# Patient Record
Sex: FEMALE | Race: WHITE | NOT HISPANIC OR LATINO | Employment: OTHER | URBAN - METROPOLITAN AREA
[De-identification: names, ages, dates, MRNs, and addresses within clinical notes are randomized per-mention and may not be internally consistent; named-entity substitution may affect disease eponyms.]

---

## 2017-07-31 ENCOUNTER — PREPPED CHART (OUTPATIENT)
Dept: URBAN - METROPOLITAN AREA CLINIC 46 | Facility: CLINIC | Age: 72
End: 2017-07-31

## 2018-04-26 NOTE — PATIENT DISCUSSION
Based on today's exam, diagnostic studies, and/or review of records, the determination was made for observation and follow-up.

## 2018-08-13 ASSESSMENT — VISUAL ACUITY
OS_CC: 20/20
OD_CC: 20/20
OS_SC: 20/70
OS_CC: J1
OD_CC: J1
OD_SC: 20/30
OD_SC: J>10
OS_SC: J10

## 2018-08-13 ASSESSMENT — TONOMETRY
OD_IOP_MMHG: 19
OS_IOP_MMHG: 19

## 2018-08-16 ENCOUNTER — ESTABLISHED COMPREHENSIVE EXAM (OUTPATIENT)
Dept: URBAN - METROPOLITAN AREA CLINIC 46 | Facility: CLINIC | Age: 73
End: 2018-08-16

## 2018-08-16 DIAGNOSIS — E11.9: ICD-10-CM

## 2018-08-16 DIAGNOSIS — H52.7: ICD-10-CM

## 2018-08-16 PROCEDURE — G8427 DOCREV CUR MEDS BY ELIG CLIN: HCPCS

## 2018-08-16 PROCEDURE — 92015 DETERMINE REFRACTIVE STATE: CPT

## 2018-08-16 PROCEDURE — 2022F DILAT RTA XM EVC RTNOPTHY: CPT

## 2018-08-16 PROCEDURE — 1036F TOBACCO NON-USER: CPT

## 2018-08-16 PROCEDURE — G9903 PT SCRN TBCO ID AS NON USER: HCPCS

## 2018-08-16 PROCEDURE — 92014 COMPRE OPH EXAM EST PT 1/>: CPT

## 2018-08-16 ASSESSMENT — VISUAL ACUITY
OS_AM: 20/20
OS_SC: 20/70
OS_CC: 20/30
OD_SC: 20/40
OD_CC: 20/20-1
OS_SC: J5
OD_PAM: 20/25
OD_SC: J<10
OD_CC: J2
OS_CC: J1+
OS_PH: 20/40
OD_PH: 20/25

## 2018-08-16 ASSESSMENT — TONOMETRY
OD_IOP_MMHG: 18
OS_IOP_MMHG: 18

## 2019-08-20 ENCOUNTER — ESTABLISHED COMPREHENSIVE EXAM (OUTPATIENT)
Dept: URBAN - METROPOLITAN AREA CLINIC 46 | Facility: CLINIC | Age: 74
End: 2019-08-20

## 2019-08-20 DIAGNOSIS — E11.9: ICD-10-CM

## 2019-08-20 DIAGNOSIS — H52.7: ICD-10-CM

## 2019-08-20 PROCEDURE — 92015 DETERMINE REFRACTIVE STATE: CPT

## 2019-08-20 PROCEDURE — 92014 COMPRE OPH EXAM EST PT 1/>: CPT

## 2019-08-20 ASSESSMENT — VISUAL ACUITY
OS_SC: J10
OD_CC: J3
OD_SC: 20/30
OS_CC: J3
OD_CC: 20/25
OS_CC: 20/25
OD_SC: J<10
OS_SC: 20/70

## 2019-08-20 ASSESSMENT — TONOMETRY
OS_IOP_MMHG: 18
OD_IOP_MMHG: 18

## 2019-08-28 NOTE — PATIENT DISCUSSION
8/28/19 Based on today's exam, diagnostic studies, and/or review of records, the determination was made for observation and follow-up. mild worsening OD.

## 2019-08-28 NOTE — PATIENT DISCUSSION
Monitor. May need anterior orbitotomy OU with ex/bx in the future. Not pushing forward at this time.

## 2019-08-29 NOTE — PATIENT DISCUSSION
Care After your Pain Management Procedure            Dr Santos   [phone]    ____Epidural Steroid Injection            ____Caudal Epidural Steroid Injection  ____Facet Joint Injection                        ____Hip Joint Injection        ____Selective Spinal Nerve Injection  ____Transforaminal Epidural Steroid Injection       ____SI Joint Injection  ____Piriformis Muscle Injection      Activity  • Do activity as tolerated.     Bathing  • You may shower tomorrow.    Diet  • You may resume your regular diet.    Dressing/Incision Care   • Keep the injection site clean and dry.   • You may use an ice pack on and off over the injection site for the next 24 hours while awake as needed.    Medication  • Start your home medications today.       Call the Neurospine Physiatrist if you have the following:  • Drainage, increase in redness and/or swelling from the injection site.   • Temperature above 101 degrees.    • Numbness or weakness of your legs or arms different than before the injection.   • Severe headache.                        Retinal tear and detachment warning symptoms reviewed and patient instructed to call immediately if increasing floaters, flashes, or decreasing peripheral vision.

## 2019-10-08 NOTE — PATIENT DISCUSSION
Patient informed of available non-opioid medications and other non-pharmacological options. Discussed advantages and disadvantages of the alternatives, including whether the patient is at-risk for, or has a history of, controlled substance abuse or misuse, and the patient’s personal preferences. [address] [facility] “Opioid Alternative Pamphlet” was provided to patient.

## 2019-10-29 NOTE — PATIENT DISCUSSION
Patient informed of available non-opioid medications and other non-pharmacological options. Discussed advantages and disadvantages of the alternatives, including whether the patient is at-risk for, or has a history of, controlled substance abuse or misuse, and the patient’s personal preferences. 516 [facility] “Opioid Alternative Pamphlet” was provided to patient.

## 2019-10-29 NOTE — PATIENT DISCUSSION
Patient informed of available non-opioid medications and other non-pharmacological options. Discussed advantages and disadvantages of the alternatives, including whether the patient is at-risk for, or has a history of, controlled substance abuse or misuse, and the patient’s personal preferences. [facility] “Opioid Alternative Pamphlet” was provided to patient.

## 2019-10-29 NOTE — PATIENT DISCUSSION
Recommended excision/discard during eyelid surgery. Smoking cessation .discussed in great details.  P-t will not consider Chantix at this time due to concerns of possible side effects  OTC choices discussed - patch, lozenges  Hot line info provided  Start Wellbutrin as RX

## 2019-10-30 NOTE — PATIENT DISCUSSION
One day post op: great curve and shape, no infection, healing well, sutures intact, use erythromycin TID to upper eyelids, use cold packs, sleep at a 30 degree angle. Wear sunglasses and hat while outdoors. Avoid sun exposure.

## 2019-10-30 NOTE — PATIENT DISCUSSION
Patient informed of available non-opioid medications and other non-pharmacological options. Discussed advantages and disadvantages of the alternatives, including whether the patient is at-risk for, or has a history of, controlled substance abuse or misuse, and the patient’s personal preferences. [facility] “Opioid Alternative Pamphlet” was provided to patient.

## 2019-11-05 NOTE — PATIENT DISCUSSION
One week post op: lids in good position, no infection, healing well, use erythromycin QHS to lower eyelids and Tobradex QHS x 7-10 days. Wear sunglasses and hat while outdoors. Avoid sun exposure. No restrictions in 5 days.

## 2019-11-05 NOTE — PATIENT DISCUSSION
One week post op: great curve and shape, no infection, healing well, sutures intact and removed, use erythromycin bid to upper eyelids x 1 wk, qhs x1wk.  Wear sunglasses and hat while outdoors. Avoid sun exposure. No restrictions in 5 days.

## 2019-11-20 NOTE — PATIENT DISCUSSION
Three week post op: lids in good position, no infection, healing well. Wear sunglasses and hat while outdoors. Avoid sun exposure. No restrictions.

## 2019-11-20 NOTE — PATIENT DISCUSSION
Three week post op: great curve and shape, no infection, healing well.  Wear sunglasses and hat while outdoors. Avoid sun exposure. No restrictions.

## 2019-11-20 NOTE — PATIENT DISCUSSION
Patient informed of available non-opioid medications and other non-pharmacological options. Discussed advantages and disadvantages of the alternatives, including whether the patient is at-risk for, or has a history of, controlled substance abuse or misuse, and the patient’s personal preferences. 516 [facility] “Opioid Alternative Pamphlet” was provided to patient.

## 2020-05-12 NOTE — PATIENT DISCUSSION
Based on today's exam, diagnostic studies, and/or review of records, the determination was made for observation and follow-up.
Continue topical antibiotic as directed.
Discussed condition and exacerbating conditions/situations (e.g., dry/arid environments, overhead fans, air conditioners, side effect of medications).
Discussed importance of yearly eye exams and provided patient with literature.
Discussed lid hygiene, warm compress and eyelid wash.
Healing well.
Hx LVC OU TWICE.  I do not recommend any further LVC attempts.
Minimal risk for malignant transformation discussed with patient. Recommend to observe and follow with serial exams.
Monitor.
Monitor. May need anterior orbitotomy OU with ex/bx in the future. Not pushing forward at this time.
No Retinal holes, Tears or Detachments.
OTC Pataday, cold compresses, Durezol QD samples given call STAT if worse.  try to STOP eye rubbing this makes it worse.
Observe.
Patient informed of available non-opioid medications and other non-pharmacological options. Discussed advantages and disadvantages of the alternatives, including whether the patient is at-risk for, or has a history of, controlled substance abuse or misuse, and the patient’s personal preferences. 516 [facility] “Opioid Alternative Pamphlet” was provided to patient.
Patient understands condition, prognosis and need for follow up care.
Recommend BRANDAN first then RUL anterior orbitotomy. Will follow up with patient early April 2020.
Recommend Bilateral lower lid blepharoplasty trans conj laser (discussed risks and benefits of sx. .. ).
Recommend Bilateral upper lid blepharoplasty. predeterm (discussed risks and benefits of sx. .. ).
Recommended artificial tears to use: 1 drop 4x a day in both eyes.
Recommended excision/discard during eyelid surgery.
Recommended observation.
Retinal tear and detachment warning symptoms reviewed and patient instructed to call immediately if increasing floaters, flashes, or decreasing peripheral vision.
Three week post op: great curve and shape, no infection, healing well.  Wear sunglasses and hat while outdoors. Avoid sun exposure. No restrictions.
Three week post op: lids in good position, no infection, healing well. Wear sunglasses and hat while outdoors. Avoid sun exposure. No restrictions.
No

## 2020-05-20 NOTE — PATIENT DISCUSSION
Patient informed of available non-opioid medications and other non-pharmacological options. Discussed advantages and disadvantages of the alternatives, including whether the patient is at-risk for, or has a history of, controlled substance abuse or misuse, and the patient’s personal preferences. 516 [facility] “Opioid Alternative Pamphlet” was provided to patient.

## 2020-06-01 NOTE — PATIENT DISCUSSION
Patient informed of available non-opioid medications and other non-pharmacological options. Discussed advantages and disadvantages of the alternatives, including whether the patient is at-risk for, or has a history of, controlled substance abuse or misuse, and the patient’s personal preferences. 516 [facility] “Opioid Alternative Pamphlet” was provided to patient.

## 2020-06-01 NOTE — PATIENT DISCUSSION
Patient informed of available non-opioid medications and other non-pharmacological options. Discussed advantages and disadvantages of the alternatives, including whether the patient is at-risk for, or has a history of, controlled substance abuse or misuse, and the patient’s personal preferences. 516 [facility] “Opioid Alternative Pamphlet” was provided to patient.

## 2020-06-01 NOTE — PROCEDURE NOTE: SURGICAL
<p><strong>NAME OF PHYSICIAN:&nbsp; Cassie Vergara MD</strong><br /><strong>PLACE OF SERVICE:&nbsp; [facility]</strong></p><p><strong></strong><br /><strong>#[id number]____________________________________________________________________________</strong><br /><br /><strong>PREOPERATIVE DIAGNOSIS: &nbsp; </strong>Fatty lesion, left orbit&nbsp; <br /><br /><strong>POSTOPERATIVE DIAGNOSIS: &nbsp; </strong>Same<br /><br /><strong>PROCEDURE: &nbsp; </strong>Anterior orbitotomy, with removal of fatty<br />&nbsp; lesion left orbit<br /><br /><strong>SURGEON: &nbsp; </strong>Sohail Barahona MD <br /><br /><strong>ANESTHESIA:&nbsp; </strong>Local MAC<br /><br /><strong>ESTIMATED BLOOD LOSS: &nbsp; </strong>Minimal, &lt; 10 cc&rsquo;s <br /><br /><strong>COMPLICATIONS: &nbsp; </strong>None.<br /><br /><strong>INDICATION:&nbsp; </strong>This patient had an elevated, fatty tumor of the left orbit which is pushing the lid off the globe and causing irritation. This has increased in size. We have talked about the risks and benefits of removing this and the patient understands the risks and benefits, including the risk of working with the eye, which includes double vision, loss of vision, bleeding and infection and dry eye and wishes to proceed. &nbsp; <br /><br /><strong>PROCEDURE:&nbsp; </strong>Prior to surgery, a time-out was performed in the pre-operative area.&nbsp; The team, consisting of the surgeon, nurse anesthetist, pre-op nurse and circulating nurse, were gathered around the patient. &nbsp; I confirmed the patient&rsquo;s identity and name, the side and site of the surgery and the type of surgery and procedure to be performed. &nbsp; The patient was brought to the operating room and laid in the supine position and marked with a blue marking pen along the left orbit and confirmed with the patient that this was the site of the left herniated fatty mass. The patient was then sedated and injected along the left lateral canthus and left superior fornix using Xylocaine 2% with epinephrine. &nbsp; The patient was then prepped and draped in the typical fashion for ophthalmic plastic surgery. A lid speculum was placed in the loeft eye and a &nbsp;7-0 nylon suture was placed through the limbus area of the left eye taking a partial thickness bite with the spatula needle so traction could be applied to the eye.&nbsp; The eye was then retracted downward and inward.&nbsp; A large herniation of fatty material was seen. A Andrew scissors was then used to make an incision to the conjunctiva and Tenon&rsquo;s capsule with a large herniation of fatty mass. &nbsp; The fatty mass was then teased away from the lateral rectus muscle and was cauterized with a bi-polar cautery meticulously along this base. The specium was sent to pathology. &nbsp; After cauterizing along the base of this fatty mass the lesion was removed with a Andrew scissors and then placed in a container for permanent section evaluation. &nbsp; The base of the fatty mass was cauterized again with bi-polar cautery meticulously for hemostasis care had been taken to stay away from the rectus muscle to avoid double vision. &nbsp; The area of the fornix was then closed with interrupted buried 6-0 plain sutures through conjunctiva and Tenon&rsquo;s capsule. &nbsp; After several of these sutures were placed the speculum was removed and ointment was placed to the eye.&nbsp; The patient was sent to the recovery room in stable condition. &nbsp;<br /></p>&nbsp;

## 2020-06-10 NOTE — PATIENT DISCUSSION
recommend whenit bothers pt enough to have sx on OD orbitotomy  (anterior) discussed risks and beneftits.

## 2020-09-08 ENCOUNTER — ESTABLISHED COMPREHENSIVE EXAM (OUTPATIENT)
Dept: URBAN - METROPOLITAN AREA CLINIC 46 | Facility: CLINIC | Age: 75
End: 2020-09-08

## 2020-09-08 DIAGNOSIS — H52.7: ICD-10-CM

## 2020-09-08 DIAGNOSIS — H18.51: ICD-10-CM

## 2020-09-08 DIAGNOSIS — H04.123: ICD-10-CM

## 2020-09-08 DIAGNOSIS — E11.9: ICD-10-CM

## 2020-09-08 PROCEDURE — 92015 DETERMINE REFRACTIVE STATE: CPT

## 2020-09-08 PROCEDURE — 92014 COMPRE OPH EXAM EST PT 1/>: CPT

## 2020-09-08 ASSESSMENT — TONOMETRY
OS_IOP_MMHG: 16
OD_IOP_MMHG: 16

## 2020-09-08 ASSESSMENT — VISUAL ACUITY
OS_CC: J3
OS_SC: 20/80-1
OS_CC: 20/20
OD_SC: J>10
OD_CC: J3
OS_SC: J10
OD_SC: 20/40-1
OD_CC: 20/30

## 2020-11-12 NOTE — PATIENT DISCUSSION
5/2020 KMS:  OTC Pataday, cold compresses, Durezol QD samples given call STAT if worse. try to STOP eye rubbing this makes it worse.

## 2020-11-12 NOTE — PATIENT DISCUSSION
11/12/20:  add Sterilid QHS OU.  continue Pataday QD OU.  add PA BID for 4 weeks then QD for 4 weeks FU in 8 weeks.

## 2021-01-11 NOTE — PATIENT DISCUSSION
No Retinal holes, Tears or Detachments. CT without significant abnormality.    PO intolerance of unclear etiology.  Will try Zofran, po challenge.  f/u with me 2 weeks.

## 2021-09-15 ENCOUNTER — ESTABLISHED COMPREHENSIVE EXAM (OUTPATIENT)
Dept: URBAN - METROPOLITAN AREA CLINIC 46 | Facility: CLINIC | Age: 76
End: 2021-09-15

## 2021-09-15 DIAGNOSIS — H04.123: ICD-10-CM

## 2021-09-15 DIAGNOSIS — H18.513: ICD-10-CM

## 2021-09-15 DIAGNOSIS — E11.9: ICD-10-CM

## 2021-09-15 DIAGNOSIS — H52.7: ICD-10-CM

## 2021-09-15 DIAGNOSIS — E11.3292: ICD-10-CM

## 2021-09-15 PROCEDURE — 92015 DETERMINE REFRACTIVE STATE: CPT

## 2021-09-15 PROCEDURE — 92014 COMPRE OPH EXAM EST PT 1/>: CPT

## 2021-09-15 ASSESSMENT — VISUAL ACUITY
OD_CC: 20/25-1
OD_CC: J3
OS_CC: 20/25
OD_SC: 20/40
OD_SC: J>10
OS_SC: J6
OS_SC: 20/200
OS_CC: J1

## 2021-09-15 ASSESSMENT — TONOMETRY
OD_IOP_MMHG: 17
OS_IOP_MMHG: 17

## 2022-06-20 ENCOUNTER — EMERGENCY VISIT (OUTPATIENT)
Dept: URBAN - METROPOLITAN AREA CLINIC 46 | Facility: CLINIC | Age: 77
End: 2022-06-20

## 2022-06-20 DIAGNOSIS — H43.813: ICD-10-CM

## 2022-06-20 DIAGNOSIS — E11.3292: ICD-10-CM

## 2022-06-20 DIAGNOSIS — E11.9: ICD-10-CM

## 2022-06-20 DIAGNOSIS — H04.123: ICD-10-CM

## 2022-06-20 DIAGNOSIS — H18.513: ICD-10-CM

## 2022-06-20 PROCEDURE — 92012 INTRM OPH EXAM EST PATIENT: CPT

## 2022-06-20 ASSESSMENT — VISUAL ACUITY
OD_CC: 20/30-1
OS_CC: 20/25

## 2022-06-20 ASSESSMENT — TONOMETRY
OS_IOP_MMHG: 17
OD_IOP_MMHG: 16

## 2022-09-15 ENCOUNTER — COMPREHENSIVE EXAM (OUTPATIENT)
Dept: URBAN - METROPOLITAN AREA CLINIC 46 | Facility: CLINIC | Age: 77
End: 2022-09-15

## 2022-09-15 DIAGNOSIS — H18.513: ICD-10-CM

## 2022-09-15 DIAGNOSIS — H43.813: ICD-10-CM

## 2022-09-15 DIAGNOSIS — E11.3292: ICD-10-CM

## 2022-09-15 DIAGNOSIS — H52.7: ICD-10-CM

## 2022-09-15 DIAGNOSIS — H04.123: ICD-10-CM

## 2022-09-15 DIAGNOSIS — E11.9: ICD-10-CM

## 2022-09-15 PROCEDURE — 92015 DETERMINE REFRACTIVE STATE: CPT

## 2022-09-15 PROCEDURE — 92014 COMPRE OPH EXAM EST PT 1/>: CPT

## 2022-09-15 ASSESSMENT — VISUAL ACUITY
OD_SC: 20/25
OS_CC: 20/25
OD_CC: 20/25-1
OD_CC: J3
OS_SC: J6
OS_SC: 20/70

## 2022-09-15 ASSESSMENT — TONOMETRY
OS_IOP_MMHG: 16
OD_IOP_MMHG: 17

## 2023-09-21 ENCOUNTER — COMPREHENSIVE EXAM (OUTPATIENT)
Dept: URBAN - METROPOLITAN AREA CLINIC 46 | Facility: CLINIC | Age: 78
End: 2023-09-21

## 2023-09-21 DIAGNOSIS — H18.513: ICD-10-CM

## 2023-09-21 DIAGNOSIS — H04.123: ICD-10-CM

## 2023-09-21 DIAGNOSIS — E11.3292: ICD-10-CM

## 2023-09-21 DIAGNOSIS — H52.7: ICD-10-CM

## 2023-09-21 PROCEDURE — 92014 COMPRE OPH EXAM EST PT 1/>: CPT

## 2023-09-21 PROCEDURE — 92134 CPTRZ OPH DX IMG PST SGM RTA: CPT

## 2023-09-21 PROCEDURE — 92015 DETERMINE REFRACTIVE STATE: CPT

## 2023-09-21 ASSESSMENT — TONOMETRY
OD_IOP_MMHG: 13
OS_IOP_MMHG: 13

## 2023-09-21 ASSESSMENT — VISUAL ACUITY
OS_SC: J10
OD_CC: 20/20
OS_CC: 20/30
OD_CC: J1
OU_CC: 20/20
OD_SC: 20/30
OU_SC: 20/30
OU_SC: J10
OS_SC: 20/70
OU_CC: J1
OS_CC: J3

## 2024-11-21 ENCOUNTER — COMPREHENSIVE EXAM (OUTPATIENT)
Dept: URBAN - METROPOLITAN AREA CLINIC 46 | Facility: CLINIC | Age: 79
End: 2024-11-21

## 2024-11-21 DIAGNOSIS — H18.513: ICD-10-CM

## 2024-11-21 DIAGNOSIS — H04.123: ICD-10-CM

## 2024-11-21 DIAGNOSIS — E11.3292: ICD-10-CM

## 2024-11-21 DIAGNOSIS — H52.7: ICD-10-CM

## 2024-11-21 PROCEDURE — 92134 CPTRZ OPH DX IMG PST SGM RTA: CPT

## 2024-11-21 PROCEDURE — 92014 COMPRE OPH EXAM EST PT 1/>: CPT

## 2024-11-21 PROCEDURE — 92015 DETERMINE REFRACTIVE STATE: CPT

## 2025-06-03 ENCOUNTER — APPOINTMENT (EMERGENCY)
Dept: RADIOLOGY | Facility: HOSPITAL | Age: 80
End: 2025-06-03
Payer: MEDICARE

## 2025-06-03 ENCOUNTER — HOSPITAL ENCOUNTER (INPATIENT)
Facility: HOSPITAL | Age: 80
LOS: 2 days | Discharge: HOME | End: 2025-06-05
Attending: EMERGENCY MEDICINE | Admitting: STUDENT IN AN ORGANIZED HEALTH CARE EDUCATION/TRAINING PROGRAM
Payer: MEDICARE

## 2025-06-03 DIAGNOSIS — H81.10 BENIGN PAROXYSMAL POSITIONAL VERTIGO, UNSPECIFIED LATERALITY: ICD-10-CM

## 2025-06-03 DIAGNOSIS — R42 DIZZINESS: ICD-10-CM

## 2025-06-03 DIAGNOSIS — G45.9 TIA (TRANSIENT ISCHEMIC ATTACK): Primary | ICD-10-CM

## 2025-06-03 LAB
ALBUMIN SERPL-MCNC: 4.6 G/DL (ref 3.5–5.7)
ALP SERPL-CCNC: 43 IU/L (ref 34–125)
ALT SERPL-CCNC: 15 IU/L (ref 7–52)
ANION GAP SERPL CALC-SCNC: 7 MEQ/L (ref 3–15)
AST SERPL-CCNC: 16 IU/L (ref 13–39)
BACTERIA URNS QL MICRO: NORMAL /HPF
BASOPHILS # BLD: 0.04 K/UL (ref 0.01–0.1)
BASOPHILS NFR BLD: 0.7 %
BILIRUB SERPL-MCNC: 0.4 MG/DL (ref 0.3–1.2)
BILIRUB UR QL STRIP.AUTO: NEGATIVE MG/DL
BUN SERPL-MCNC: 19 MG/DL (ref 7–25)
CALCIUM SERPL-MCNC: 10.1 MG/DL (ref 8.6–10.3)
CHLORIDE SERPL-SCNC: 104 MEQ/L (ref 98–107)
CLARITY UR REFRACT.AUTO: CLEAR
CO2 SERPL-SCNC: 26 MEQ/L (ref 21–31)
COLOR UR AUTO: COLORLESS
CREAT SERPL-MCNC: 0.8 MG/DL (ref 0.6–1.2)
DIFFERENTIAL METHOD BLD: ABNORMAL
EGFRCR SERPLBLD CKD-EPI 2021: >60 ML/MIN/1.73M*2
EOSINOPHIL # BLD: 0.05 K/UL (ref 0.04–0.36)
EOSINOPHIL NFR BLD: 0.9 %
ERYTHROCYTE [DISTWIDTH] IN BLOOD BY AUTOMATED COUNT: 12.2 % (ref 11.7–14.4)
GLUCOSE BLD-MCNC: 147 MG/DL (ref 70–99)
GLUCOSE SERPL-MCNC: 171 MG/DL (ref 70–99)
GLUCOSE UR STRIP.AUTO-MCNC: NEGATIVE MG/DL
HCT VFR BLD AUTO: 37.7 % (ref 35–45)
HGB BLD-MCNC: 13 G/DL (ref 11.8–15.7)
HGB UR QL STRIP.AUTO: 1
HYALINE CASTS #/AREA URNS LPF: NORMAL /LPF
IMM GRANULOCYTES # BLD AUTO: 0.01 K/UL (ref 0–0.08)
IMM GRANULOCYTES NFR BLD AUTO: 0.2 %
KETONES UR STRIP.AUTO-MCNC: NEGATIVE MG/DL
LEUKOCYTE ESTERASE UR QL STRIP.AUTO: NEGATIVE
LYMPHOCYTES # BLD: 2.48 K/UL (ref 1.2–3.5)
LYMPHOCYTES NFR BLD: 42.5 %
MCH RBC QN AUTO: 34.9 PG (ref 28–33.2)
MCHC RBC AUTO-ENTMCNC: 34.5 G/DL (ref 32.2–35.5)
MCV RBC AUTO: 101.1 FL (ref 83–98)
MONOCYTES # BLD: 0.52 K/UL (ref 0.28–0.8)
MONOCYTES NFR BLD: 8.9 %
NEUTROPHILS # BLD: 2.74 K/UL (ref 1.7–7)
NEUTS SEG NFR BLD: 46.8 %
NITRITE UR QL STRIP.AUTO: NEGATIVE
NRBC BLD-RTO: 0 %
PH UR STRIP.AUTO: 6.5 [PH]
PLATELET # BLD AUTO: 207 K/UL (ref 150–369)
PMV BLD AUTO: 10.5 FL (ref 9.4–12.3)
POCT TEST: ABNORMAL
POTASSIUM SERPL-SCNC: 3.8 MEQ/L (ref 3.5–5.1)
PROT SERPL-MCNC: 7.7 G/DL (ref 6–8.2)
PROT UR QL STRIP.AUTO: NEGATIVE
RBC # BLD AUTO: 3.73 M/UL (ref 3.93–5.22)
RBC #/AREA URNS HPF: NORMAL /HPF
SODIUM SERPL-SCNC: 137 MEQ/L (ref 136–145)
SP GR UR REFRACT.AUTO: 1.01
SQUAMOUS URNS QL MICRO: NORMAL /HPF
TROPONIN I SERPL HS-MCNC: 8.1 PG/ML
TROPONIN I SERPL HS-MCNC: 8.3 PG/ML
UROBILINOGEN UR STRIP-ACNC: 0.2 EU/DL
WBC # BLD AUTO: 5.84 K/UL (ref 3.8–10.5)
WBC #/AREA URNS HPF: NORMAL /HPF

## 2025-06-03 PROCEDURE — 63700000 HC SELF-ADMINISTRABLE DRUG

## 2025-06-03 PROCEDURE — 85025 COMPLETE CBC W/AUTO DIFF WBC: CPT

## 2025-06-03 PROCEDURE — 84484 ASSAY OF TROPONIN QUANT: CPT | Mod: 91 | Performed by: EMERGENCY MEDICINE

## 2025-06-03 PROCEDURE — 99223 1ST HOSP IP/OBS HIGH 75: CPT | Performed by: STUDENT IN AN ORGANIZED HEALTH CARE EDUCATION/TRAINING PROGRAM

## 2025-06-03 PROCEDURE — 36415 COLL VENOUS BLD VENIPUNCTURE: CPT

## 2025-06-03 PROCEDURE — 81001 URINALYSIS AUTO W/SCOPE: CPT

## 2025-06-03 PROCEDURE — 84484 ASSAY OF TROPONIN QUANT: CPT | Performed by: EMERGENCY MEDICINE

## 2025-06-03 PROCEDURE — 93005 ELECTROCARDIOGRAM TRACING: CPT

## 2025-06-03 PROCEDURE — 85025 COMPLETE CBC W/AUTO DIFF WBC: CPT | Performed by: EMERGENCY MEDICINE

## 2025-06-03 PROCEDURE — 99285 EMERGENCY DEPT VISIT HI MDM: CPT | Mod: 25

## 2025-06-03 PROCEDURE — 80053 COMPREHEN METABOLIC PANEL: CPT | Performed by: EMERGENCY MEDICINE

## 2025-06-03 PROCEDURE — 93005 ELECTROCARDIOGRAM TRACING: CPT | Performed by: EMERGENCY MEDICINE

## 2025-06-03 PROCEDURE — 12000000 HC ROOM AND CARE MED/SURG

## 2025-06-03 PROCEDURE — 70450 CT HEAD/BRAIN W/O DYE: CPT

## 2025-06-03 RX ORDER — ASCORBIC ACID 125 MG
5000 TABLET,CHEWABLE ORAL DAILY
COMMUNITY

## 2025-06-03 RX ORDER — ADHESIVE BANDAGE 7/8"
15-30 BANDAGE TOPICAL AS NEEDED
Status: DISCONTINUED | OUTPATIENT
Start: 2025-06-03 | End: 2025-06-05 | Stop reason: HOSPADM

## 2025-06-03 RX ORDER — MECLIZINE HYDROCHLORIDE 25 MG/1
25 TABLET ORAL ONCE
Status: COMPLETED | OUTPATIENT
Start: 2025-06-03 | End: 2025-06-03

## 2025-06-03 RX ORDER — FENOFIBRATE 160 MG/1
160 TABLET ORAL EVERY MORNING
COMMUNITY

## 2025-06-03 RX ORDER — FAMOTIDINE 10 MG/1
10 TABLET ORAL EVERY MORNING
COMMUNITY

## 2025-06-03 RX ORDER — IMMUNE GLOBULIN INTRAVENOUS (HUMAN) 10% 5 G/50ML
300 LIQUID INTRAVENOUS
COMMUNITY

## 2025-06-03 RX ORDER — ZANUBRUTINIB 80 MG/1
160 CAPSULE, GELATIN COATED ORAL 2 TIMES DAILY
COMMUNITY

## 2025-06-03 RX ORDER — ACETAMINOPHEN 500 MG/1
500 CAPSULE, LIQUID FILLED ORAL EVERY MORNING
COMMUNITY

## 2025-06-03 RX ORDER — ATORVASTATIN CALCIUM 10 MG/1
10 TABLET, FILM COATED ORAL DAILY
Status: DISCONTINUED | OUTPATIENT
Start: 2025-06-04 | End: 2025-06-05 | Stop reason: HOSPADM

## 2025-06-03 RX ORDER — FENOFIBRATE 200 MG/1
200 CAPSULE ORAL
Status: DISCONTINUED | OUTPATIENT
Start: 2025-06-04 | End: 2025-06-05 | Stop reason: HOSPADM

## 2025-06-03 RX ORDER — FAMOTIDINE 10 MG/1
10 TABLET ORAL EVERY MORNING
Status: DISCONTINUED | OUTPATIENT
Start: 2025-06-04 | End: 2025-06-05 | Stop reason: HOSPADM

## 2025-06-03 RX ORDER — DULAGLUTIDE 1.5 MG/.5ML
1.5 INJECTION, SOLUTION SUBCUTANEOUS WEEKLY
COMMUNITY

## 2025-06-03 RX ORDER — METFORMIN HYDROCHLORIDE 850 MG/1
TABLET ORAL
COMMUNITY
End: 2025-06-03 | Stop reason: ENTERED-IN-ERROR

## 2025-06-03 RX ORDER — LANOLIN ALCOHOL/MO/W.PET/CERES
2000 CREAM (GRAM) TOPICAL DAILY
Status: DISCONTINUED | OUTPATIENT
Start: 2025-06-04 | End: 2025-06-05 | Stop reason: HOSPADM

## 2025-06-03 RX ORDER — CALCIUM CARBONATE 1250 MG/5ML
250 SUSPENSION ORAL DAILY
Status: DISCONTINUED | OUTPATIENT
Start: 2025-06-04 | End: 2025-06-04

## 2025-06-03 RX ORDER — ACETAMINOPHEN 325 MG/1
650 TABLET ORAL EVERY 4 HOURS PRN
Status: DISCONTINUED | OUTPATIENT
Start: 2025-06-03 | End: 2025-06-05 | Stop reason: HOSPADM

## 2025-06-03 RX ORDER — DEXTROSE 50 % IN WATER (D50W) INTRAVENOUS SYRINGE
25 AS NEEDED
Status: DISCONTINUED | OUTPATIENT
Start: 2025-06-03 | End: 2025-06-05 | Stop reason: HOSPADM

## 2025-06-03 RX ORDER — DEXTROSE 40 %
15-30 GEL (GRAM) ORAL AS NEEDED
Status: DISCONTINUED | OUTPATIENT
Start: 2025-06-03 | End: 2025-06-05 | Stop reason: HOSPADM

## 2025-06-03 RX ORDER — SIMVASTATIN 20 MG/1
20 TABLET, FILM COATED ORAL NIGHTLY
COMMUNITY

## 2025-06-03 RX ORDER — BROMPHENIRAMINE MALEATE, PSEUDOEPHEDRINE HYDROCHLORIDE, AND DEXTROMETHORPHAN HYDROBROMIDE 2; 30; 10 MG/5ML; MG/5ML; MG/5ML
SYRUP ORAL
COMMUNITY
End: 2025-06-03 | Stop reason: ENTERED-IN-ERROR

## 2025-06-03 RX ORDER — ENOXAPARIN SODIUM 100 MG/ML
40 INJECTION SUBCUTANEOUS
Status: DISCONTINUED | OUTPATIENT
Start: 2025-06-04 | End: 2025-06-05 | Stop reason: HOSPADM

## 2025-06-03 RX ADMIN — MECLIZINE HYDROCHLORIDE 25 MG: 25 TABLET ORAL at 20:12

## 2025-06-03 ASSESSMENT — ENCOUNTER SYMPTOMS
FEVER: 0
DIZZINESS: 1
SPEECH DIFFICULTY: 0
HEMATURIA: 0
NAUSEA: 1
DYSURIA: 0
COUGH: 0
NUMBNESS: 0
HEADACHES: 1
FLANK PAIN: 0
ABDOMINAL PAIN: 0
FACIAL ASYMMETRY: 0
DIARRHEA: 0
CHILLS: 0
VOMITING: 0
SHORTNESS OF BREATH: 1
FREQUENCY: 0

## 2025-06-03 NOTE — ED PROVIDER NOTES
Emergency Medicine Note  HPI   HISTORY OF PRESENT ILLNESS       History provided by:  Patient and medical records   used: No      80-year-old female with PMH of CKD, HTN, HLD, immunodeficiency disorder, T2DM, and chronic lymphoid leukemia presents to the ED via EMS for evaluation of dizziness x 10 hours. Patient states she started with room spinning dizziness, vision disturbance, headache, nausea, and weakness around 10 am this morning. She lost her balance and fell, however denies head injury. Patient was able to get herself to her chair and started to feel better after a while. Family state patient seemed confused, but they are not sure if maybe she was just having trouble hearing them. Patient improved and was able to walk around and go to the bathroom without difficulty, however then again felt dizzy around 6 pm tonight. Dizziness is worse with head movements. She states while sitting in bed, she does not feel dizzy, just fatigued. Patient notes tingling in bilateral hands and some shortness of breath. Denies fever, chills, CP, cough, abdominal pain, vomiting, diarrhea, dysuria, hematuria, flank pain, urinary frequency, slurred speech, facial droop.      Patient History   PAST HISTORY     Reviewed from Nursing Triage:  Tobacco  Allergies  Meds  Problems  Med Hx  Surg Hx  Fam Hx  Soc   Hx      Past Medical History:   Diagnosis Date    CVID (common variable immunodeficiency) (CMS/[facility])     Lipid disorder     Type 2 diabetes mellitus (CMS/[facility])        No past surgical history on file.    No family history on file.    Social History     Tobacco Use    Smoking status: Former     Types: Cigarettes    Smokeless tobacco: Never   Substance Use Topics    Alcohol use: Never    Drug use: Never         Review of Systems   REVIEW OF SYSTEMS     Review of Systems   Constitutional:  Negative for chills and fever.   Eyes:  Positive for visual disturbance.   Respiratory:  Positive for shortness of breath.  Negative for cough.    Cardiovascular:  Negative for chest pain and leg swelling.   Gastrointestinal:  Positive for nausea. Negative for abdominal pain, diarrhea and vomiting.   Genitourinary:  Negative for dysuria, flank pain, frequency and hematuria.   Neurological:  Positive for dizziness and headaches. Negative for syncope, facial asymmetry, speech difficulty and numbness.         VITALS     ED Vitals      Date/Time Temp Pulse Resp BP SpO2 [facility]   06/03/25 2215 -- 83 16 179/81 98 % [facility]   06/03/25 2051 -- 82 22  210/95 98 % [facility]   06/03/25 2013 -- 94 18 182/81 98 % [facility]   06/03/25 1910 36.7 °C (98.1 °F) 90 20 194/93 96 % [facility]          Pulse Ox %: 96 % (06/03/25 1935)  Pulse Ox Interpretation: Normal (06/03/25 1935)  Heart Rate: 90 (06/03/25 1935)  Rhythm Strip Interpretation: Normal Sinus Rhythm (06/03/25 1935)     Physical Exam   PHYSICAL EXAM     Physical Exam  Vitals and nursing note reviewed.   Constitutional:       General: She is not in acute distress.     Appearance: She is well-developed. She is not toxic-appearing.   HENT:      Head: Normocephalic and atraumatic.   Eyes:      General: No visual field deficit.     Extraocular Movements: Extraocular movements intact.      Pupils: Pupils are equal, round, and reactive to light.   Cardiovascular:      Rate and Rhythm: Normal rate and regular rhythm.   Pulmonary:      Effort: Pulmonary effort is normal. No respiratory distress.      Breath sounds: No wheezing, rhonchi or rales.   Abdominal:      General: Bowel sounds are normal. There is no distension.      Palpations: Abdomen is soft.      Tenderness: There is no abdominal tenderness.   Musculoskeletal:         General: No deformity.   Skin:     General: Skin is warm and dry.   Neurological:      General: No focal deficit present.      Mental Status: She is alert and oriented to person, place, and time.      GCS: GCS eye subscore is 4. GCS verbal subscore is 5. GCS motor subscore is 6.      Cranial Nerves: No  dysarthria or facial asymmetry.      Sensory: Sensation is intact.      Motor: No pronator drift.      Coordination: Finger-Nose-Finger Test normal.      Comments: CN II-XII grossly intact  Strength 5/5 in bilateral upper and lower extremities  Sensation intact to light touch bilaterally  No ataxia  Tongue midline  Speech clear   Psychiatric:         Mood and Affect: Mood normal.         Behavior: Behavior normal.           PROCEDURES     Procedures     DATA     Results       Procedure Component Value Units Date/Time    UA w/ reflex culture (ED Only) [[id number]]  (Abnormal) Collected: 06/03/25 2025    Specimen: Urine, Clean Catch Updated: 06/03/25 2039    Narrative:      The following orders were created for panel order UA w/ reflex culture (ED Only).  Procedure                               Abnormality         Status                     ---------                               -----------         ------                     UA Reflex to Culture (Ma...[[id number]]  Abnormal            Final result               UA Microscopic[[id number]]               Normal              Final result                 Please view results for these tests on the individual orders.    UA Microscopic [[id number]]  (Normal) Collected: 06/03/25 2025    Specimen: Urine, Clean Catch Updated: 06/03/25 2039     RBC, Urine 0 TO 4 /HPF      WBC, Urine 0 TO 3 /HPF      Squamous Epithelial None Seen /hpf      Hyaline Cast None Seen /lpf      Bacteria, Urine None Seen /HPF     UA Reflex to Culture (Macroscopic) [[id number]]  (Abnormal) Collected: 06/03/25 2025    Specimen: Urine, Clean Catch Updated: 06/03/25 2038     Color, Urine Colorless     Clarity, Urine Clear     Specific Gravity, Urine 1.006     pH, Urine 6.5     Leukocyte Esterase Negative     Comment: Results can be falsely negative due to high specific gravity, some antibiotics, glucose >3 g/dl, or WBC other than neutrophils.        Nitrite, Urine Negative     Protein, Urine Negative      Glucose, Urine Negative mg/dL      Ketones, Urine Negative mg/dL      Urobilinogen, Urine 0.2 EU/dL      Bilirubin, Urine Negative mg/dL      Blood, Urine +1     Comment: The sensitivity of the occult blood test is equivalent to approximately 4 intact RBC/HPF.       HS Troponin (with 2 hour reflex) [[id number]]  (Normal) Collected: 06/03/25 1915    Specimen: Blood, Venous Updated: 06/03/25 1953     High Sens Troponin I 8.3 pg/mL     Comprehensive metabolic panel [[id number]]  (Abnormal) Collected: 06/03/25 1915    Specimen: Blood, Venous Updated: 06/03/25 1946     Sodium 137 mEQ/L      Potassium 3.8 mEQ/L      Comment: Results obtained on plasma. Plasma Potassium values may be up to 0.4 mEQ/L less than serum values. The differences may be greater for patients with high platelet or white cell counts.        Chloride 104 mEQ/L      CO2 26 mEQ/L      BUN 19 mg/dL      Creatinine 0.8 mg/dL      Glucose 171 mg/dL      Calcium 10.1 mg/dL      AST (SGOT) 16 IU/L      ALT (SGPT) 15 IU/L      Alkaline Phosphatase 43 IU/L      Total Protein 7.7 g/dL      Comment: Test performed on plasma which typically contains approximately 0.4 g/dL more protein than serum.        Albumin 4.6 g/dL      Bilirubin, Total 0.4 mg/dL      eGFR >60.0 mL/min/1.73m*2      Comment: Calculation based on the Chronic Kidney Disease Epidemiology Collaboration (CKD-EPI) equation refit without adjustment for race.        Anion Gap 7 mEQ/L     CBC and differential [[id number]]  (Abnormal) Collected: 06/03/25 1915    Specimen: Blood, Venous Updated: 06/03/25 1927     WBC 5.84 K/uL      RBC 3.73 M/uL      Hemoglobin 13.0 g/dL      Hematocrit 37.7 %      .1 fL      MCH 34.9 pg      MCHC 34.5 g/dL      RDW 12.2 %      Platelets 207 K/uL      MPV 10.5 fL      Differential Type Auto     nRBC 0.0 %      Immature Granulocytes 0.2 %      Neutrophils 46.8 %      Lymphocytes 42.5 %      Monocytes 8.9 %      Eosinophils 0.9 %      Basophils 0.7 %      Immature  Granulocytes, Absolute 0.01 K/uL      Neutrophils, Absolute 2.74 K/uL      Lymphocytes, Absolute 2.48 K/uL      Monocytes, Absolute 0.52 K/uL      Eosinophils, Absolute 0.05 K/uL      Basophils, Absolute 0.04 K/uL     [place] Draw Panel [[id number]] Collected: 06/03/25 1915    Specimen: Blood, Venous Updated: 06/03/25 1921    Narrative:      The following orders were created for panel order [place] Draw Panel.  Procedure                               Abnormality         Status                     ---------                               -----------         ------                     RAINBOW LT BLUE[[id number]]                                  In process                   Please view results for these tests on the individual orders.    RAINBOW LT BLUE [[id number]] Collected: 06/03/25 1915    Specimen: Blood, Venous Updated: 06/03/25 1921            Imaging Results              CT HEAD WITHOUT IV CONTRAST (Final result)  Result time 06/03/25 21:39:39      Final result                   Impression:    IMPRESSION:  No evidence of acute intracranial hemorrhage, mass effect or large acute  territorial infarction by CT criteria..  Possible abnormal appearance of the calvarium/osseous structures either due to  significant demineralization or underlying neoplastic process such as myeloma or  metastatic disease.  Follow-up bone scan as well as further clinical workup  suggested.  Follow-up brain MRI may also be helpful to evaluate calvarial marrow  signal.    COMMENT:    Comparison: None    TECHNIQUE: CT of the brain was performed without intravenous contrast.  CT DOSE:  One or more dose reduction techniques (e.g. automated exposure  control, adjustment of the mA and/or kV according to patient size, use of  iterative reconstruction technique) utilized for this examination.    FINDINGS:  Ventricles, sulci and basal cisterns: Prominent compatible with cerebral volume  loss without hydrocephalus  Parenchyma: Patchy nonspecific  probably microangiopathic white matter disease.  No evidence of acute intracranial hemorrhage.  No mass effect or cerebral edema.  No CT evidence of large acute territorial infarction.  Extra-axial spaces: No extra-axial fluid collection.  Imaged paranasal sinuses and mastoids: No significant opacities.  Calvarium: Innumerable punctate lucencies in the osseous structures uncertain if  this is related to demineralization or if there is underlying multifocal  neoplastic process.  Extra calvarial structures:Lens replacements.  Miscellaneous: Atherosclerotic vascular calcifications.  Partially empty sella.               Narrative:    CLINICAL HISTORY: Dizziness, persistent, recurring  Headache, new or worsening (Age >= 50y)                                      ECG 12 lead          Scoring tools                              ED Stroke Documentation    ED Stroke Documentation    Last Known Well  Date Last Known Well: 06/03/25  Time Last Known Well: 1000  Time Last Known Well: 1000  Onset of Symptoms - Date: 06/03/25  Onset of Symptoms - Time: 1010    Did Patient Receive IV Thrombolytic Therapy? (If delayed or no, must document reason): No  Reason for not Giving IV Thrombolytic Therapy: Symptoms resolved             Did/Will Patient Receive Neuro-Intervention/Thrombectomy? (If no, must document reason): No  Reason for not Performing Neuro-Intervention/Thrombectomy: Symptoms resolved                        ED Course & MDM   MDM / ED COURSE / CLINICAL IMPRESSION / DISPO     Medical Decision Making  80-year-old female here for evaluation of dizziness.  Neuroexam is nonfocal.  I was able to elicit some dizziness with head movements.  No dizziness while at rest.  NIH 0.  Differential diagnosis includes vertigo, ICH, TIA, electrolyte abnormality, ACS, cardiac arrhythmia.  Plan labs, imaging, reassess. Details and further MDM per ED course    Vital signs have been reviewed. The oxygen saturation is 96% which is normal.  This  document was created using Dragon dictation software.  There may be some typographical errors due to this technology.    Problems Addressed:  Dizziness: acute illness or injury  TIA (transient ischemic attack): acute illness or injury    Amount and/or Complexity of Data Reviewed  Labs: ordered.  Radiology: ordered. Decision-making details documented in ED Course.  ECG/medicine tests: ordered.    Risk  Prescription drug management.  Decision regarding hospitalization.        ED Course as of 06/04/25 0013   Tue Jun 03, 2025 1952 Impression: dizziness, headache, nausea, fatigue    Plan: labs, trop, ua, EKG, ct head, monitor [SG]   2131 BP(!)(S): 210/95  Will treat with permissive htn at this time [SG]   2142 CT HEAD WITHOUT IV CONTRAST  IMPRESSION:  No evidence of acute intracranial hemorrhage, mass effect or large acute territorial infarction by CT criteria..  Possible abnormal appearance of the calvarium/osseous structures either due to  significant demineralization or underlying neoplastic process such as myeloma or  metastatic disease.  Follow-up bone scan as well as further clinical workup suggested.  Follow-up brain MRI may also be helpful to evaluate calvarial marrow signal. [SG]   2223 BP(!): 179/81 [SG]   2243 HMS paged [SG]   0509 Case was discussed with hospitalist, Dr Castellanos.  Reviewed patient's presentation, ED course, and relevant data.  Hospitalist accepts patient on their service and will see / admit pt. [SG]   Wed Jun 04, 2025   0013 Aspirin not given due to concerns for allergy to NSAIDs patient cannot clarify if she can tolerate aspirin [RW]      ED Course User Index  [RW] Miguel Angel Jung MD  [SG] Lynnette Up PA C     Clinical Impression      TIA (transient ischemic attack)   Dizziness     _________________       ED Disposition   Admit / Observation                       Lynnette Up PA C  06/03/25 1664       Lynnette Up PA C  06/04/25 0014

## 2025-06-03 NOTE — PATIENT DISCUSSION
Health Maintenance   Topic Date Due    Colorectal Cancer Screen  Never done    Hepatitis B vaccine (1 of 3 - 19+ 3-dose series) 10/10/2025 (Originally 9/7/1985)    Hepatitis C screen  05/19/2026 (Originally 9/7/1984)    HIV screen  05/19/2026 (Originally [birth year]/7/1981)    Depression Monitoring  05/19/2026    Lipids  05/20/2026    Breast cancer screen  06/20/2026    Cervical cancer screen  02/24/2027    Diabetes screen  05/20/2028    DTaP/Tdap/Td vaccine (2 - Td or Tdap) 05/19/2035    Flu vaccine  Completed    Shingles vaccine  Completed    Pneumococcal 50+ years Vaccine  Completed    COVID-19 Vaccine  Completed    Hepatitis A vaccine  Aged Out    Hib vaccine  Aged Out    Polio vaccine  Aged Out    Meningococcal (ACWY) vaccine  Aged Out    Meningococcal B vaccine  Aged Out    Pneumococcal 0-49 years Vaccine  Discontinued             (applicable per patient's age: Cancer Screenings, Depression Screening, Fall Risk Screening, Immunizations)    Hemoglobin A1C (%)   Date Value   05/20/2025 5.4   10/10/2023 5.1     AST (U/L)   Date Value   05/20/2025 22     ALT (U/L)   Date Value   05/20/2025 9 (L)     BUN (mg/dL)   Date Value   05/20/2025 17      (goal A1C is < 7)   (goal LDL is <100) need 30-50% reduction from baseline     BP Readings from Last 3 Encounters:   05/19/25 128/72   05/15/25 (!) 142/94   03/21/25 118/76    (goal /80)      All Future Testing planned in [facility]:  Lab Frequency Next Occurrence   US NON OB TRANSVAGINAL Once 06/25/2024   CT ABDOMEN PELVIS WO CONTRAST Additional Contrast? Radiologist Recommendation Once 11/13/2024   XR KNEE RIGHT (3 VIEWS) Once 02/17/2025   ALEXY Digital Screen Bilateral [[id number]] Once 06/18/2025       Next Visit Date:  Future Appointments   Date Time Provider Department Center   6/11/2025  1:45 PM Ruddy Quintero, PT [facility] [place]   6/13/2025  2:00 PM Madison Astudillo PTA [facility] [place]   6/17/2025  2:15 PM Devyn Ferguson PTA [facility] [place]   6/19/2025 12:45 PM Ivy Chaudhari, PTA  Minimal risk for malignant transformation discussed with patient. Recommend to observe and follow with serial exams.

## 2025-06-04 ENCOUNTER — APPOINTMENT (INPATIENT)
Dept: RADIOLOGY | Facility: HOSPITAL | Age: 80
End: 2025-06-04
Attending: NURSE PRACTITIONER
Payer: MEDICARE

## 2025-06-04 PROBLEM — I16.0 HYPERTENSIVE URGENCY: Status: ACTIVE | Noted: 2025-06-04

## 2025-06-04 PROBLEM — G45.9 TIA (TRANSIENT ISCHEMIC ATTACK): Status: ACTIVE | Noted: 2025-06-04

## 2025-06-04 LAB
ANION GAP SERPL CALC-SCNC: 7 MEQ/L (ref 3–15)
ATRIAL RATE: 83
BUN SERPL-MCNC: 18 MG/DL (ref 7–25)
CALCIUM SERPL-MCNC: 9.7 MG/DL (ref 8.6–10.3)
CHLORIDE SERPL-SCNC: 108 MEQ/L (ref 98–107)
CHOLEST SERPL-MCNC: 159 MG/DL
CO2 SERPL-SCNC: 25 MEQ/L (ref 21–31)
CREAT SERPL-MCNC: 0.9 MG/DL (ref 0.6–1.2)
EGFRCR SERPLBLD CKD-EPI 2021: >60 ML/MIN/1.73M*2
ERYTHROCYTE [DISTWIDTH] IN BLOOD BY AUTOMATED COUNT: 12.4 % (ref 11.7–14.4)
EST. AVERAGE GLUCOSE BLD GHB EST-MCNC: 151 MG/DL
GLUCOSE BLD-MCNC: 169 MG/DL (ref 70–99)
GLUCOSE BLD-MCNC: 195 MG/DL (ref 70–99)
GLUCOSE BLD-MCNC: 216 MG/DL (ref 70–99)
GLUCOSE BLD-MCNC: 218 MG/DL (ref 70–99)
GLUCOSE SERPL-MCNC: 177 MG/DL (ref 70–99)
HBA1C MFR BLD: 6.9 %
HCT VFR BLD AUTO: 38.1 % (ref 35–45)
HDLC SERPL-MCNC: 68 MG/DL
HDLC SERPL: 2.3 {RATIO}
HGB BLD-MCNC: 13.2 G/DL (ref 11.8–15.7)
LDLC SERPL CALC-MCNC: 62 MG/DL
MCH RBC QN AUTO: 35 PG (ref 28–33.2)
MCHC RBC AUTO-ENTMCNC: 34.6 G/DL (ref 32.2–35.5)
MCV RBC AUTO: 101.1 FL (ref 83–98)
NONHDLC SERPL-MCNC: 91 MG/DL
P AXIS: 34
PLATELET # BLD AUTO: 203 K/UL (ref 150–369)
PMV BLD AUTO: 10.6 FL (ref 9.4–12.3)
POCT TEST: ABNORMAL
POTASSIUM SERPL-SCNC: 3.6 MEQ/L (ref 3.5–5.1)
PR INTERVAL: 188
QRS DURATION: 70
QT INTERVAL: 376
QTC CALCULATION(BAZETT): 441
R AXIS: -16
RBC # BLD AUTO: 3.77 M/UL (ref 3.93–5.22)
SODIUM SERPL-SCNC: 140 MEQ/L (ref 136–145)
T WAVE AXIS: 22
TRIGL SERPL-MCNC: 146 MG/DL
VENTRICULAR RATE: 83
WBC # BLD AUTO: 6.13 K/UL (ref 3.8–10.5)

## 2025-06-04 PROCEDURE — A9579 GAD-BASE MR CONTRAST NOS,1ML: HCPCS | Mod: JW | Performed by: NURSE PRACTITIONER

## 2025-06-04 PROCEDURE — 36415 COLL VENOUS BLD VENIPUNCTURE: CPT | Performed by: STUDENT IN AN ORGANIZED HEALTH CARE EDUCATION/TRAINING PROGRAM

## 2025-06-04 PROCEDURE — 80048 BASIC METABOLIC PNL TOTAL CA: CPT | Performed by: STUDENT IN AN ORGANIZED HEALTH CARE EDUCATION/TRAINING PROGRAM

## 2025-06-04 PROCEDURE — 83036 HEMOGLOBIN GLYCOSYLATED A1C: CPT | Performed by: STUDENT IN AN ORGANIZED HEALTH CARE EDUCATION/TRAINING PROGRAM

## 2025-06-04 PROCEDURE — 97166 OT EVAL MOD COMPLEX 45 MIN: CPT | Mod: GO

## 2025-06-04 PROCEDURE — 70553 MRI BRAIN STEM W/O & W/DYE: CPT

## 2025-06-04 PROCEDURE — 99223 1ST HOSP IP/OBS HIGH 75: CPT | Performed by: PSYCHIATRY & NEUROLOGY

## 2025-06-04 PROCEDURE — 85027 COMPLETE CBC AUTOMATED: CPT | Performed by: STUDENT IN AN ORGANIZED HEALTH CARE EDUCATION/TRAINING PROGRAM

## 2025-06-04 PROCEDURE — A9573: HCPCS | Mod: JW | Performed by: NURSE PRACTITIONER

## 2025-06-04 PROCEDURE — 80061 LIPID PANEL: CPT | Performed by: STUDENT IN AN ORGANIZED HEALTH CARE EDUCATION/TRAINING PROGRAM

## 2025-06-04 PROCEDURE — 97535 SELF CARE MNGMENT TRAINING: CPT | Mod: GO

## 2025-06-04 PROCEDURE — 97110 THERAPEUTIC EXERCISES: CPT | Mod: GP

## 2025-06-04 PROCEDURE — 97162 PT EVAL MOD COMPLEX 30 MIN: CPT | Mod: GP

## 2025-06-04 PROCEDURE — 63700000 HC SELF-ADMINISTRABLE DRUG: Performed by: NURSE PRACTITIONER

## 2025-06-04 PROCEDURE — 20600000 HC ROOM AND CARE INTERMEDIATE/TELEMETRY

## 2025-06-04 PROCEDURE — 63700000 HC SELF-ADMINISTRABLE DRUG: Performed by: STUDENT IN AN ORGANIZED HEALTH CARE EDUCATION/TRAINING PROGRAM

## 2025-06-04 PROCEDURE — 99233 SBSQ HOSP IP/OBS HIGH 50: CPT | Mod: FS | Performed by: INTERNAL MEDICINE

## 2025-06-04 PROCEDURE — 63600000 HC DRUGS/DETAIL CODE: Performed by: STUDENT IN AN ORGANIZED HEALTH CARE EDUCATION/TRAINING PROGRAM

## 2025-06-04 RX ORDER — HYDRALAZINE HYDROCHLORIDE 20 MG/ML
10 INJECTION INTRAMUSCULAR; INTRAVENOUS EVERY 6 HOURS PRN
Status: DISCONTINUED | OUTPATIENT
Start: 2025-06-04 | End: 2025-06-05 | Stop reason: HOSPADM

## 2025-06-04 RX ORDER — INSULIN LISPRO 100 [IU]/ML
3-5 INJECTION, SOLUTION INTRAVENOUS; SUBCUTANEOUS
Status: DISCONTINUED | OUTPATIENT
Start: 2025-06-04 | End: 2025-06-05 | Stop reason: HOSPADM

## 2025-06-04 RX ORDER — NAPROXEN SODIUM 220 MG/1
81 TABLET, FILM COATED ORAL DAILY
Status: DISCONTINUED | OUTPATIENT
Start: 2025-06-04 | End: 2025-06-05 | Stop reason: HOSPADM

## 2025-06-04 RX ORDER — POTASSIUM CHLORIDE 20 MEQ/1
20 TABLET, EXTENDED RELEASE ORAL ONCE
Status: COMPLETED | OUTPATIENT
Start: 2025-06-04 | End: 2025-06-04

## 2025-06-04 RX ADMIN — THERA TABS 1 TABLET: TAB at 09:03

## 2025-06-04 RX ADMIN — ATORVASTATIN CALCIUM 10 MG: 10 TABLET, FILM COATED ORAL at 09:03

## 2025-06-04 RX ADMIN — CYANOCOBALAMIN TAB 1000 MCG 2000 MCG: 1000 TAB at 09:03

## 2025-06-04 RX ADMIN — GADOPICLENOL 6.1 ML: 485.1 INJECTION INTRAVENOUS at 20:51

## 2025-06-04 RX ADMIN — ENOXAPARIN SODIUM 40 MG: 40 INJECTION SUBCUTANEOUS at 17:37

## 2025-06-04 RX ADMIN — FENOFIBRATE 200 MG: 200 CAPSULE ORAL at 09:05

## 2025-06-04 RX ADMIN — HYDRALAZINE HYDROCHLORIDE 10 MG: 20 INJECTION INTRAMUSCULAR; INTRAVENOUS at 01:32

## 2025-06-04 RX ADMIN — FAMOTIDINE 10 MG: 10 TABLET, FILM COATED ORAL at 09:03

## 2025-06-04 RX ADMIN — INSULIN LISPRO 3 UNITS: 100 INJECTION, SOLUTION INTRAVENOUS; SUBCUTANEOUS at 12:09

## 2025-06-04 RX ADMIN — ASPIRIN 81 MG CHEWABLE TABLET 81 MG: 81 TABLET CHEWABLE at 10:59

## 2025-06-04 RX ADMIN — POTASSIUM CHLORIDE 20 MEQ: 1500 TABLET, EXTENDED RELEASE ORAL at 15:49

## 2025-06-04 RX ADMIN — INSULIN LISPRO 3 UNITS: 100 INJECTION, SOLUTION INTRAVENOUS; SUBCUTANEOUS at 17:34

## 2025-06-04 ASSESSMENT — COGNITIVE AND FUNCTIONAL STATUS - GENERAL
WALKING IN HOSPITAL ROOM: 3 - A LITTLE
MOVING TO AND FROM BED TO CHAIR: 3 - A LITTLE
STANDING UP FROM CHAIR USING ARMS: 3 - A LITTLE
WALKING IN HOSPITAL ROOM: 3 - A LITTLE
CLIMB 3 TO 5 STEPS WITH RAILING: 3 - A LITTLE
WALKING IN HOSPITAL ROOM: 2 - A LOT
DRESSING REGULAR UPPER BODY CLOTHING: 3 - A LITTLE
DRESSING REGULAR LOWER BODY CLOTHING: 3 - A LITTLE
STANDING UP FROM CHAIR USING ARMS: 2 - A LOT
AFFECT: WFL
HELP NEEDED FOR PERSONAL GROOMING: 3 - A LITTLE
CLIMB 3 TO 5 STEPS WITH RAILING: 3 - A LITTLE
AFFECT: WFL;FLAT/BLUNTED AFFECT
CLIMB 3 TO 5 STEPS WITH RAILING: 2 - A LOT
MOVING TO AND FROM BED TO CHAIR: 3 - A LITTLE
HELP NEEDED FOR BATHING: 3 - A LITTLE
MOVING TO AND FROM BED TO CHAIR: 3 - A LITTLE
EATING MEALS: 4 - NONE
TOILETING: 3 - A LITTLE
STANDING UP FROM CHAIR USING ARMS: 3 - A LITTLE

## 2025-06-04 ASSESSMENT — ENCOUNTER SYMPTOMS
ENDOCRINE NEGATIVE: 1
CARDIOVASCULAR NEGATIVE: 1
DIZZINESS: 1
GASTROINTESTINAL NEGATIVE: 1
MUSCULOSKELETAL NEGATIVE: 1
BRUISES/BLEEDS EASILY: 0
NAUSEA: 1
VOMITING: 0
WEAKNESS: 0
RESPIRATORY NEGATIVE: 1
FATIGUE: 0
PSYCHIATRIC NEGATIVE: 1
LIGHT-HEADEDNESS: 1
HEMATOLOGIC/LYMPHATIC NEGATIVE: 1
CHILLS: 0
FEVER: 0
PALPITATIONS: 0
SPEECH DIFFICULTY: 1

## 2025-06-04 NOTE — PLAN OF CARE
Problem: Adult Inpatient Plan of Care  Goal: Plan of Care Review  Outcome: Progressing  Flowsheets (Taken 6/4/2025 1520)  Progress: improving  Outcome Evaluation: PT eval complete.  Plan of Care Reviewed With: patient

## 2025-06-04 NOTE — STUDENT
Hospital Medicine    History & Physical        CHIEF COMPLAINT   Dizziness    HISTORY OF PRESENT ILLNESS   Lanny Rey is a 80 y.o. female w/ a PMH of chronic lymphoid leukemia, type 2 diabetes mellitus, hyperlipidemia, MGUS, common variable immunodeficiency, prior colon cancer, prior breast cancer, prior squamous cell carcinoma of the skin, prior malignant melanoma of the skin, and prior PFO with surgical repair who presents to the emergency department for evaluation of dizziness.  According to the patient, she developed dizziness as well as difficulty reading a book due to blurred vision. This would wax and wane throughout the day.  Patient later developed impaired balance and almost fell over in her home.  This is associated with mild aphasia as well as a headache, nausea, and tingling in the bilateral hands. The patient denies any similar events in the past or recent illnesses. The patient denies starting or stopping any medications recently.    PAST MEDICAL AND SURGICAL HISTORY   Past Medical History:   Diagnosis Date    CVID (common variable immunodeficiency) (CMS/[facility], located within [facility])     Lipid disorder     Type 2 diabetes mellitus (CMS/[facility], located within [facility])        No past surgical history on file.    PCP: Lombardo Paz, Gina   MEDICATIONS   Prior to Admission medications   Medication Sig Start Date End Date Taking? Authorizing Provider   acetaminophen 500 mg capsule Take 500 mg by mouth every morning.   Yes ProviderBenita MD   dulaglutide (TRULICITY) 1.5 mg/0.5 mL pen injector Inject 1.5 mg under the skin once a week on Sunday.   Yes ProviderBenita MD   famotidine (PEPCID) 10 mg tablet Take 10 mg by mouth every morning.   Yes ProviderBeniat MD   fenofibrate (TRIGLIDE) 160 mg tablet Take 160 mg by mouth every morning.   Yes ProviderBenita MD   simvastatin (ZOCOR) 20 mg tablet Take 20 mg by mouth nightly.   Yes ProviderBenita MD   zanubrutinib (BRUKINSA) 80 mg capsule Take  160 mg by mouth 2 (two) times a day.   Yes Benita Loera MD   biotin 5,000 mcg tablet,chewable Take 5,000 mcg by mouth daily.    Benita Loera MD   CALCIUM ORAL Take 1 Dose by mouth once daily.    Benita Loera MD   cyanocobalamin, vitamin B-12, 5,000 mcg capsule Take 5,000 mcg by mouth daily.    Benita Loera MD   immun glob G,IgG,-gly-IgA ov50 (BIVIGAM) 10 % solution 300 mg/kg.    Benita Loera MD   multivitamin tablet Take 1 tablet by mouth daily.    Benita Loera MD      ALLERGIES   Celebrex [celecoxib] and Penicillins   FAMILY HISTORY   No family history on file.   SOCIAL HISTORY   Social History     Substance and Sexual Activity   Drug Use Never     Tobacco Use: Medium Risk (6/3/2025)    Patient History     Smoking Tobacco Use: Former     Smokeless Tobacco Use: Never     Passive Exposure: Not on file     Alcohol Use: Not At Risk (6/4/2025)    AUDIT-C     Frequency of Alcohol Consumption: Never     Average Number of Drinks: Patient does not drink     Frequency of Binge Drinking: Never     Within the past week have you used heroin or used opioid medications other than as prescribed? (OxyContin, Fentanyl, Subutex): No  Do you get sick if you cannot use heroin, methadone, or opioid medications?: No          REVIEW OF SYSTEMS   Review of Systems   Constitutional:  Negative for chills, fatigue and fever.   Eyes:  Positive for visual disturbance.        Blurred vision   Respiratory: Negative.     Cardiovascular:  Negative for chest pain and palpitations.   Gastrointestinal: Negative.    Endocrine: Negative.    Genitourinary: Negative.    Skin: Negative.    Neurological:  Positive for dizziness, speech difficulty and light-headedness.        Reports tingling in bilateral hands   Hematological: Negative.  Does not bruise/bleed easily.   Psychiatric/Behavioral: Negative.              PHYSICAL EXAMINATION   Temp:  [36.7 °C (98.1 °F)-36.8 °C (98.2  °F)] 36.8 °C (98.2 °F)  Heart Rate:  [82-94] 84  Resp:  [16-22] 16  BP: (179-210)/(81-95) 197/93  Body mass index is 25.51 kg/m².      Physical Exam  Constitutional:       General: She is not in acute distress.     Appearance: She is ill-appearing.   HENT:      Head: Normocephalic and atraumatic.      Nose: No congestion or rhinorrhea.   Eyes:      General: No scleral icterus.     Conjunctiva/sclera: Conjunctivae normal.   Cardiovascular:      Rate and Rhythm: Normal rate and regular rhythm.      Heart sounds: No murmur heard.     No gallop.   Pulmonary:      Breath sounds: No wheezing, rhonchi or rales.   Abdominal:      General: Abdomen is flat.      Palpations: Abdomen is soft.   Musculoskeletal:      Right lower leg: No edema.      Left lower leg: No edema.   Skin:     General: Skin is warm and dry.   Neurological:      General: No focal deficit present.      Mental Status: She is alert and oriented to person, place, and time.      Comments: Mildly decreased left upper extremity strength secondary to pain.        LABS / IMAGING / EKG   CHEMISTRIES   Results from last 7 days   Lab Units 06/03/25  1915   SODIUM mEQ/L 137   POTASSIUM mEQ/L 3.8   CHLORIDE mEQ/L 104   CO2 mEQ/L 26   BUN mg/dL 19   CREATININE mg/dL 0.8   GLUCOSE mg/dL 171*   CALCIUM mg/dL 10.1   EGFR mL/min/1.73m*2 >60.0   ANION GAP mEQ/L 7     CBC RESULTS   Results from last 7 days   Lab Units 06/03/25  1915   WBC K/uL 5.84   HEMOGLOBIN g/dL 13.0   HEMATOCRIT % 37.7   PLATELETS K/uL 207          Radiology Reports in last 24hCT HEAD WITHOUT IV CONTRAST  Result Date: 6/3/2025  CLINICAL HISTORY: Dizziness, persistent, recurring Headache, new or worsening (Age >= 50y)     IMPRESSION: No evidence of acute intracranial hemorrhage, mass effect or large acute territorial infarction by CT criteria.. Possible abnormal appearance of the calvarium/osseous structures either due to significant demineralization or underlying neoplastic process such as myeloma or  metastatic disease.  Follow-up bone scan as well as further clinical workup suggested.  Follow-up brain MRI may also be helpful to evaluate calvarial marrow signal. COMMENT: Comparison: None TECHNIQUE: CT of the brain was performed without intravenous contrast. CT DOSE:  One or more dose reduction techniques (e.g. automated exposure control, adjustment of the mA and/or kV according to patient size, use of iterative reconstruction technique) utilized for this examination. FINDINGS: Ventricles, sulci and basal cisterns: Prominent compatible with cerebral volume loss without hydrocephalus Parenchyma: Patchy nonspecific probably microangiopathic white matter disease. No evidence of acute intracranial hemorrhage.  No mass effect or cerebral edema. No CT evidence of large acute territorial infarction. Extra-axial spaces: No extra-axial fluid collection. Imaged paranasal sinuses and mastoids: No significant opacities. Calvarium: Innumerable punctate lucencies in the osseous structures uncertain if this is related to demineralization or if there is underlying multifocal neoplastic process. Extra calvarial structures:Lens replacements. Miscellaneous: Atherosclerotic vascular calcifications.  Partially empty sella.     EKG: NSR, 83bpm, no signs of acute ischemia     ASSESSMENT AND PLAN   TIA VS. RPLS  -   Symptoms began at approximately 10 AM on the morning of 6/3/2025 and manifested as blurred vision, dizziness/impaired balance, mild  aphasia, headache, nausea, and tingling in the bilateral hands.  - Glucose range: 147-171 (since 6/3/25)  - Hemoglobin: 13 (6/3/25)  - Platelets: 207 (6/3/25)    - Possibly triggered by severely elevated blood pressure.  - CT head does not show any evidence of an acute intracranial hemorrhage, mass effect, or large territorial infarct.    - Aspirin not given due to documented celecoxib allergy with unknown reaction per the patient.  - MRI brain pending.  - Continue permissive hypertension.  -  Neurochecks and NIHSS per stroke protocol.  - Neurology consulted.    HYPERTENSIVE URGENCY   HTN  - 24hr BP range: (179-210) / (81-95) (6/3/25)  - SBP range: 179 mmHg-210 mmHg, DBP range: 81 mmHg-95 mmHg (since 6/3/25)  - Maintain permissive hypertension in the setting of possible CVA.  - Hydralazine as needed for systolic blood pressure greater than 180 mmHg.    ABNORMAL IMAGING FINDING ([facility])  - CT of the head incidentally showed innumerable punctate lucencies in the osseous structures; it is unclear if this is related to degenerative osteo changes or an underlying multifocal neoplastic process.  - MRI brain pending.  - Consider hematology/oncology consultation based on MRI results.    CLL  - Chronic condition.  - Continue home regimen of zanubrutinib (patient supplied).    HLD  - continue previous home medication: fenofibrate 200 mg po daily  - start: atorvastatin 10 mg po daily (due 25) in place of nonformulary simvastatin.      Type 2 DIABETES MELLTUS  - holding home medication: dulaglutide 3 mg/mL 1.5 mg sc  - start: insulin lispro 3 - 5 units sc TID (due 25)  - carbohydrate controlled diet while in the hospital.      ::: Stable/Chronic/Resolved :::     Common variable immunodeficiency     MGUS     History of colon cancer     # History of breast cancer     # History of squamous cell carcinoma of the skin     # History of malignant melanoma of the skin     # History of PFO with surgical repair              VTE Assessment: Padua VTE Score: 8  VTE Prophylaxis: Current anticoagulants:  enoxaparin (LOVENOX) syringe 40 mg, subcutaneous, Daily (6p)      Code Status: Full Code      Estimated Discharge Date: 2025  Disposition [id number]     Romeo Le  2025

## 2025-06-04 NOTE — PLAN OF CARE
Plan of Care Review  Plan of Care Reviewed With: patient  Progress: improving  Outcome Evaluation: Admitted to 3A overnight. AAOx4. NIH 0. Q2 neuros continued. PRN hydralazine given for SBP>180. Dual skin check complete. NSR on monitor. Awaiting MRI. Fall precautions maintained. CB in reach.

## 2025-06-04 NOTE — PROGRESS NOTES
Hospital Medicine     Daily Progress Note       SUBJECTIVE   No acute overnight events. Pt reports sx resolved. No further dizziness, blurred vision, impaired balance, HA, N, hand tingling, speech/ comprehension difficulties. No CP, SOB, abd pain. Awaiting MRI brain and PT/OT      OBJECTIVE   Vital signs in last 24 hours:  Temp:  [36.4 °C (97.6 °F)-36.8 °C (98.3 °F)] 36.6 °C (97.9 °F)  Heart Rate:  [82-94] 84  Resp:  [14-22] 18  BP: (135-210)/(65-95) 143/68    Intake/Output Summary (Last 24 hours) at 6/4/2025 1434  Last data filed at 6/4/2025 0800  Gross per 24 hour   Intake 10 ml   Output 1250 ml   Net -1240 ml       Physical Exam  Vitals and nursing note reviewed.   Constitutional:       General: She is not in acute distress.  HENT:      Head: Normocephalic.   Eyes:      Pupils: Pupils are equal, round, and reactive to light.   Cardiovascular:      Rate and Rhythm: Normal rate and regular rhythm.   Pulmonary:      Effort: Pulmonary effort is normal.   Abdominal:      General: Abdomen is flat.   Musculoskeletal:         General: Normal range of motion.      Cervical back: Normal range of motion.   Skin:     General: Skin is dry.   Neurological:      General: No focal deficit present.      Mental Status: She is alert and oriented to person, place, and time.   Psychiatric:         Mood and Affect: Mood normal.          LINES, DRAINS, AIRWAYS, WOUNDS   Peripheral IV (Adult) 06/03/25 Anterior;Left;Proximal Forearm (Active)   Number of days: 1        LABS, IMAGING, TELEMETRY   Labs reviewed  Results from last 7 days   Lab Units 06/04/25  0510 06/03/25 1915   WBC K/uL 6.13 5.84   HEMOGLOBIN g/dL 13.2 13.0   HEMATOCRIT % 38.1 37.7   PLATELETS K/uL 203 207     Results from last 7 days   Lab Units 06/04/25  0510 06/03/25  1915   SODIUM mEQ/L 140 137   POTASSIUM mEQ/L 3.6 3.8   CHLORIDE mEQ/L 108* 104   CO2 mEQ/L 25 26   BUN mg/dL 18 19   CREATININE mg/dL 0.9 0.8   CALCIUM mg/dL 9.7 10.1   ALBUMIN g/dL  --   4.6   BILIRUBIN TOTAL mg/dL  --  0.4   ALK PHOS IU/L  --  43   ALT IU/L  --  15   AST IU/L  --  16   GLUCOSE mg/dL 177* 171*     Results from last 7 days   Lab Units 06/03/25 2129 06/03/25 1915   HIGH SENSITIVE TROPONIN I pg/mL 8.1 8.3             Microbiology Results       ** No results found for the last 720 hours. **                Imaging reviewed  CT HEAD WITHOUT IV CONTRAST  Result Date: 6/3/2025  Narrative: CLINICAL HISTORY: Dizziness, persistent, recurring Headache, new or worsening (Age >= 50y)     Impression: IMPRESSION: No evidence of acute intracranial hemorrhage, mass effect or large acute territorial infarction by CT criteria.. Possible abnormal appearance of the calvarium/osseous structures either due to significant demineralization or underlying neoplastic process such as myeloma or metastatic disease.  Follow-up bone scan as well as further clinical workup suggested.  Follow-up brain MRI may also be helpful to evaluate calvarial marrow signal. COMMENT: Comparison: None TECHNIQUE: CT of the brain was performed without intravenous contrast. CT DOSE:  One or more dose reduction techniques (e.g. automated exposure control, adjustment of the mA and/or kV according to patient size, use of iterative reconstruction technique) utilized for this examination. FINDINGS: Ventricles, sulci and basal cisterns: Prominent compatible with cerebral volume loss without hydrocephalus Parenchyma: Patchy nonspecific probably microangiopathic white matter disease. No evidence of acute intracranial hemorrhage.  No mass effect or cerebral edema. No CT evidence of large acute territorial infarction. Extra-axial spaces: No extra-axial fluid collection. Imaged paranasal sinuses and mastoids: No significant opacities. Calvarium: Innumerable punctate lucencies in the osseous structures uncertain if this is related to demineralization or if there is underlying multifocal neoplastic process. Extra calvarial structures:Lens  replacements. Miscellaneous: Atherosclerotic vascular calcifications.  Partially empty sella.        Telemetry reviewed: SR [facility] PVCs   ASSESSMENT AND PLAN     # Stroke-like symptoms  - Presents with dizziness, blurred vision, impaired balance, HA, N, speech/ comprehensive difficulty, b/l hand tingling  - CTH shows no e/o acute intracranial hemorrhage, mass effect or large acute territorial infarction by CT criteria. Possible abnormal appearance of the calvarium/osseous structures either due to significant demineralization or underlying neoplastic process such as myeloma or metastatic disease.  Follow-up bone scan as well as further clinical workup suggested.  Follow-up brain MRI may also be helpful to evaluate calvarial marrow signal.  - Unclear etiology. Sx now resolved. DDx includes CVA, vertigo, hypertensive encepholopathy, vs malignancy.  - Check MRI brain w/ and w/o contrast for further evaluation   - Start ASA. Continue statin, fenofibrate.   - Gradual normalization of BP, avoid dehydration   - Continue neurochecks, NIH  - PT/OT consulted  - Neurology consult appreciated      # Hypertensive urgency  # Hypertension  - 24hr BP range: (135-210) / (65-95) (6/4/25)  - Elevated BP on admission, BP improved and stable after 1x IV hydralazine overnight   - Continue to monitor BP  - Continue PRN IV hydralazine     # Hyperlipidemia: with clinical ASCVD  - lipid panel: total cholesterol: 159 / calculated LDL: 62 / HDL: 68 / triglycerides: 146 (6/4/25)  - Continue statin, fenofibrate     # Type 2 diabetes mellitus  - glucose range: 147-218 (since 6/3/25)  - hemoglobin A1c: 6.9 % (6/4/25)  - Continue SSI in the hospital   - Resume dulaglutide on discharge  - Diabetic diet     # Common variable immunodeficiency  # MGUS  # History of colon cancer  # History of breast cancer  # History of squamous cell carcinoma of the skin  # History of malignant melanoma of the skin  - CT shows possible abnormal appearance of the  calvarium/osseous structures either due to significant demineralization or underlying neoplastic process such as myeloma or metastatic disease.  Follow-up bone scan as well as further clinical workup suggested.    - Labs are stable  - Check MRI brain w/ and w/o r/o malignancy with transient neurological sx  - Continue zanubrutinib   - On bivigam at home  - F/u with heme/onc team in [place]     # History of PFO with surgical repair  - No acute issues       VTE Prophylaxis:  Current anticoagulants:  enoxaparin (LOVENOX) syringe 40 mg, subcutaneous, Daily (6p)      Code Status: Full Code        Estimated Discharge Date: 6/5/2025   Disposition Planning: d/c likely home when medically stable, pending MRI brain and PT/OT evals, likely 24h       ALETHA Mccurdy  6/4/2025

## 2025-06-04 NOTE — PLAN OF CARE
"  Problem: Adult Inpatient Plan of Care  Goal: Plan of Care Review  Outcome: Progressing  Flowsheets (Taken 6/4/2025 5856)  Progress: improving  Outcome Evaluation: Pt alert and oriented afebrile. Pt at times gets \"dizzy spells' when arising from bed and chair too fast. OOB with one person assist. NIH=0. Neuro checks WNL. No blurry vision, numbness/tingling, or pain. +stress incontinence.  brought in Chemo medication this afternoon. Dose re-timed. BP elevated this morning with therapy only. After therapy pt BP decreased without any intervention.  Pt VSS. No complaints at this time.  Plan of Care Reviewed With:   patient   spouse  Goal: Patient-Specific Goal (Individualized)  Outcome: Progressing  Goal: Absence of Hospital-Acquired Illness or Injury  Outcome: Progressing  Goal: Optimal Comfort and Wellbeing  Outcome: Progressing  Goal: Readiness for Transition of Care  Outcome: Progressing     Problem: Fall Injury Risk  Goal: Absence of Fall and Fall-Related Injury  Outcome: Progressing     Problem: Skin Injury Risk Increased  Goal: Skin Health and Integrity  Outcome: Progressing     Problem: Stroke, Ischemic (Includes Transient Ischemic Attack)  Goal: Optimal Coping  Outcome: Progressing  Goal: Effective Bowel Elimination  Outcome: Progressing  Goal: Optimal Cerebral Tissue Perfusion  Outcome: Progressing  Goal: Optimal Cognitive Function  Outcome: Progressing  Goal: Improved Communication Skills  Outcome: Progressing  Goal: Optimal Functional Ability  Outcome: Progressing  Goal: Optimal Nutrition Intake  Outcome: Progressing  Goal: Effective Oxygenation and Ventilation  Outcome: Progressing  Goal: Improved Sensorimotor Function  Outcome: Progressing  Goal: Safe and Effective Swallow  Outcome: Progressing  Goal: Effective Urinary Elimination  Outcome: Progressing     Problem: Mobility Impairment  Goal: Optimal Mobility  Outcome: Progressing     Problem: Self-Care Deficit  Goal: Improved Ability to Complete " "Activities of Daily Living  Outcome: Progressing   Plan of Care Review  Plan of Care Reviewed With: patient, spouse  Progress: improving  Outcome Evaluation: Pt alert and oriented afebrile. Pt at times gets \"dizzy spells' when arising from bed and chair too fast. OOB with one person assist. NIH=0. Neuro checks WNL. No blurry vision, numbness/tingling, or pain. +stress incontinence.  brought in Chemo medication this afternoon. Dose re-timed. BP elevated this morning with therapy only. After therapy pt BP decreased without any intervention.  Pt VSS. No complaints at this time.  "

## 2025-06-04 NOTE — PLAN OF CARE
Care Coordination Admission Assessment Note    General Information:  Readmission Within the last 30 days: unable to assess  Does patient have a :    Patient-Specific Goals (include timeframe): Return to her home in [place]    Living Arrangements:  Arrived From: other (see comments)  Current Living Arrangements: home  People in Home: spouse  Home Accessibility:    Living Arrangement Comments: Currently visiting her son who lives in a 2 SH wiht a pwoder room on 1st level    Housing Stability and Utility Access (SDOH):  In the last 12 months, was there a time when you were not able to pay the mortgage or rent on time?: No  In the past 12 months, how many times have you moved?:    At any time in the past 12 months, were you homeless or living in a shelter (including now)?: No  In the past 12 months has the electric, gas, oil, or water company threatened to shut off services in your home?:      Functional Status Prior to Admission:   Assistive Device/Animal Currently Used at Home: walker, front-wheeled  Functional Status Comments: Indep  IADL Comments: IADLS     Supports and Services:  Current Outpatient/Agency/Support Group:    Type of Current Home Care Services:    History of home care episode or rehab stay:      Discharge Needs Assessment:   Concerns to be Addressed: coping/stress, discharge planning, adjustment to diagnosis/illness  Current Discharge Risk:    Anticipated Changes Related to Illness: none    Patient/Family Anticipated Discharge Plan:  Patient/Family Anticipates Transition To: home with family  Patient/Family Anticipated Services at Transition:      [facility] to Community       Patient Choice:   Offered/Gave Vendor List:         Anticipated Discharge Plan:  Met with patient. Provided education and contact information for Care Coordination services.: yes  Anticipated Discharge Disposition: family member's home without services     Transportation Needs (SDOH):  Transportation Concerns:     Transportation Anticipated:    Is Out of Hospital DNR needed at discharge?:      In the past 12 months, has lack of transportation kept you from medical appointments or from getting medications?:    In the past 12 months, has lack of transportation kept you from meetings, work, or from getting things needed for daily living?:      Concerns - comments: CC met with patient and spouse at bedside to introduce self. Patient lives in [place] with spouse and was visitng her son prior to admssion. Son has a 2 SH with a FF to second floor. Powder room on 1st level. DME: RW. Awaiting MRI, consults. Patient states she has a return flight to [place] this Sunday and will follow up with her doctors once she returns home. Will need paper prescriptions for any medication needs at time of discharge. Family to provide transport back to son's home once medically stable for discharge.

## 2025-06-04 NOTE — PROGRESS NOTES
Physical Therapy -  Initial Evaluation     Patient: Lanny Rey  Location: [facility] 3A 3023  MRN: [id number]  Today's date: 6/4/2025    HISTORY OF PRESENT ILLNESS     Lanny is a 80 y.o. female admitted on 6/3/2025 with TIA (transient ischemic attack) [G45.9]  Dizziness [R42]. Principal problem is TIA (transient ischemic attack).    Past Medical History  Lanny has a past medical history of CVID (common variable immunodeficiency) (CMS/HCC), Lipid disorder, and Type 2 diabetes mellitus (CMS/HCC).    History of Present Illness   Presents with dizziness, blurred vision, impaired balance, HA, N, speech/ comprehensive difficulty, b/l hand tingling. CTH shows no e/o acute intracranial hemorrhage, mass effect or large acute territorial infarction by CT criteria. Possible abnormal appearance of the calvarium/osseous structures either due to significant demineralization or underlying neoplastic process such as myeloma or metastatic diseas    PRIOR LEVEL OF FUNCTION AND LIVING ENVIRONMENT     Prior Level of Function      Flowsheet Row Most Recent Value   Ambulation independent   Transferring independent   Toileting independent   Bathing independent   Dressing independent   Eating independent   IADLs independent   Driving/Transportation    Communication understands/communicates without difficulty   Prior Level of Function Comment Independent PLOF, no AD use. History of one fall several years ago while rehabbing s/p prolonged hospitalization d/t COVID   Assistive Device Currently Used at Home walker, front-wheeled   History of Falls: No falls in the past year    Prior Living Environment      Flowsheet Row Most Recent Value   People in Home spouse   Current Living Arrangements home   Living Environment Comment Lives in [facility] with spouse, 1SH, 0 GAYE       VITALS AND PAIN     PT Vitals      Date/Time Pulse HR Source SpO2 Pt Activity O2 Therapy BP BP Location BP Method Pt Position Who   06/04/25  1100 87 Monitor 94 % At rest None (Room air) 175/86 Left upper arm Automatic Lying DA          PT Pain      Date/Time Pain Type Rating: Rest Rating: Activity [facility]   06/04/25 1100 Pain Assessment 0 - no pain 0 - no pain DA               Objective     OBJECTIVE     Start time:  1055  End time:  1127  Session Length: 32 min  Mode of Treatment: co-treatment  Reason for co-treatment: Optimize functional outcomes for most appropriate dispo    General Observations  Patient received reclined, in bed. She was agreeable to therapy, no issues or concerns identified by nurse prior to session. Denies symptoms at start of session    Precautions: fall              PT Eval and Treat - 06/04/25 1055          Cognition    Orientation Status oriented x 4     Affect/Mental Status WFL     Follows Commands WFL     Cognitive Function WFL        Vision Assessment/Intervention    Vision Assessment corrective lenses full-time        Hearing Assessment    Hearing Status WNL        Sensory Assessment    Sensory Assessment sensation intact, lower extremities        Lower Extremity Assessment    LE Assessment ROM and Strength WFL     General Observations No focal deficits        Bed Mobility    Bed Mobility Activities supine to sit     [place] supervision     Assistive Device head of bed elevated     Comment OOB to L, no physical assist req, stable sitting EOB        Mobility Belt    Mobility Belt Used During Session yes        Sit/Stand Transfer    [place] minimum assist (75% or more patient effort);1 person assist     Safety/Cues technique;sequencing     Assistive Device walker, front-wheeled     Transfer Comments Patient tends to hold weight posteriorly through heels, benefits from light support to promote anterior translation, proper sequencing        Gait Training    [place], Gait close supervision     Safety/Cues proper use of assistive device;technique;sequencing     Assistive Device walker, front-wheeled     Distance in  Feet 120 feet     Pattern step-through     Comment Patient ambulates in hallway with RW use, demos mildly slowed reciprocal step through pattern, no overt LOB. Demos increased unsteadiness with head turns to R vs L.        Stairs Training    [place], Stairs not tested        Balance    Static Sitting Balance WFL     Sit to Stand Dynamic Balance mild impairment;supported     Static Standing Balance mild impairment;supported     Dynamic Standing Balance moderate impairment;supported     Comment, Balance Patient demos LOB when standing unsupported with eyes closed. Performs mobility safely with SUP-min Ax1 and RW        Impairments/Safety Issues    Impairments Affecting Function balance;endurance/activity tolerance                                              Education Documentation  Joint Mobility/Strength, taught by Raul Mcdonough PT at 6/4/2025  3:20 PM.  Learner: Patient  Readiness: Acceptance  Method: Explanation  Response: Verbalizes Understanding  Comment: PT eval, POC, safety, bed mob, txfs, gait, RW use, dispo planning    Home Safety, taught by Raul Mcdonough PT at 6/4/2025  3:20 PM.  Learner: Patient  Readiness: Acceptance  Method: Explanation  Response: Verbalizes Understanding  Comment: PT eval, POC, safety, bed mob, txfs, gait, RW use, dispo planning    Energy Conservation, taught by Raul Mcdonough PT at 6/4/2025  3:20 PM.  Learner: Patient  Readiness: Acceptance  Method: Explanation  Response: Verbalizes Understanding  Comment: PT eval, POC, safety, bed mob, txfs, gait, RW use, dispo planning    Assistive/Adaptive Devices, taught by Raul Mcdonough PT at 6/4/2025  3:20 PM.  Learner: Patient  Readiness: Acceptance  Method: Explanation  Response: Verbalizes Understanding  Comment: PT eval, POC, safety, bed mob, txfs, gait, RW use, dispo planning        Session Outcome  Patient reclined, in chair at end of session, chair alarm on, personal items in reach, all needs met, call light in reach.  Nursing notified about patient's performance, patient's position, and patient's response to therapy/activity.    AM-PAC™ - Mobility (Current Function)     Turning form your back to your side while in flat bed without using bedrails 4 - None   Moving from lying on your back to sitting on the side of a flat bed without using bedrails 4 - None   Moving to and from a bed to a chair 3 - A Little   Standing up from a chair using your arms 3 - A Little   To walk in a hospital room 3 - A Little   Climbing 3-5 steps with a railing 3 - A Little   AM-PAC™ Mobility Score 20          ASSESSMENT AND PLAN     Progress Summary  Patient seen for PT eval, functionally limited by impairments of balance and activity tolerance. Performs mobility grossly with supervision and RW use. Requires intermittent min A during transfers to prevent posterior LOB. On assessment, static balance is impaired with eyes closed for sensory integration challenge. No overt impairment of vestibulo-ocular reflex on assessment. Demos more difficulty during turning movements to R > L. Dispo for return home, would benefit from AD use to ensure safety. PT will continue to follow for assessment and intervention while admitted.    Patient/Family Therapy Goals Statement: To feel better    PT Plan      Flowsheet Row Most Recent Value   Rehab Potential good, to achieve stated therapy goals at 06/04/2025 1055   Therapy Frequency 5 times/wk at 06/04/2025 1055   Planned Therapy Interventions bed mobility training, gait training, transfer training at 06/04/2025 1055       PT Discharge Recommendations      Flowsheet Row Most Recent Value   PT Recommended Discharge Disposition home with assistance, home with home health at 06/04/2025 1055   Anticipated Equipment Needs if Discharged Home (PT) walker, front-wheeled at 06/04/2025 1055            PT Goals      Flowsheet Row Most Recent Value   Bed Mobility Goal 1    Activity/Assistive Device bed mobility activities, all at  06/04/2025 1055   [place] modified independence at 06/04/2025 1055   Time Frame by discharge at 06/04/2025 1055   Progress/Outcome goal ongoing at 06/04/2025 1055   Transfer Goal 1    Activity/Assistive Device all transfers at 06/04/2025 1055   [place] modified independence at 06/04/2025 1055   Time Frame by discharge at 06/04/2025 1055   Progress/Outcome goal ongoing at 06/04/2025 1055   Gait Training Goal 1    Activity/Assistive Device gait (walking locomotion) at 06/04/2025 1055   [place] modified independence at 06/04/2025 1055   Distance 200' at 06/04/2025 1055   Time Frame by discharge at 06/04/2025 1055   Progress/Outcome goal ongoing at 06/04/2025 1055

## 2025-06-04 NOTE — PROGRESS NOTES
Occupational Therapy -  Initial Evaluation     Patient: Lanny Rey  Location: [facility] 3A 3023  MRN: [id number]  Today's date: 6/4/2025    HISTORY OF PRESENT ILLNESS     Lanny is a 80 y.o. female admitted on 6/3/2025 with TIA (transient ischemic attack) [G45.9]  Dizziness [R42]. Principal problem is TIA (transient ischemic attack).    Past Medical History  Lanny has a past medical history of CVID (common variable immunodeficiency) (CMS/HCC), Lipid disorder, and Type 2 diabetes mellitus (CMS/HCC).    History of Present Illness   Presents with dizziness, blurred vision, impaired balance, HA, N, speech/ comprehensive difficulty, b/l hand tingling. CTH shows no e/o acute intracranial hemorrhage, mass effect or large acute territorial infarction by CT criteria. Possible abnormal appearance of the calvarium/osseous structures either due to significant demineralization or underlying neoplastic process such as myeloma or metastatic diseas    PRIOR LEVEL OF FUNCTION AND LIVING ENVIRONMENT     Prior Level of Function      Flowsheet Row Most Recent Value   Ambulation independent   Transferring independent   Toileting independent   Bathing independent   Dressing independent   Eating independent   IADLs independent   Driving/Transportation    Communication understands/communicates without difficulty   Prior Level of Function Comment Independent PLOF, no AD use. History of one fall several years ago while rehabbing s/p prolonged hospitalization d/t COVID   Assistive Device Currently Used at Home walker, front-wheeled        Prior Living Environment      Flowsheet Row Most Recent Value   People in Home spouse   Current Living Arrangements home   Living Environment Comment Lives in [facility] with spouse, 1SH, 0 GAYE     Occupational Profile      Flowsheet Row Most Recent Value   Successful Occupations retired    Occupational History/Life Experiences [employer]   Performance Patterns from [place],  visiting family in [place]   Environmental Supports and Barriers lives with supportive        VITALS AND PAIN     OT Vitals      Date/Time Pulse SpO2 Pt Activity O2 Therapy BP BP Location BP Method Pt Position Observations [facility]   06/04/25 1105 87 94 % At rest None (Room air) 175/86 Left upper arm Automatic Lying -- EE   06/04/25 1125 -- -- -- -- 200/88 Left upper arm Automatic Sitting nausea + dizziness EE   06/04/25 1130 89 -- -- -- 186/87 Left upper arm Automatic Sitting -- EE          OT Pain      Date/Time Pain Type Rating: Rest Rating: Activity [facility]   06/04/25 1105 Pain Assessment 0 - no pain 0 - no pain EE               Objective     OBJECTIVE     Start time:  1105  End time:  1131  Session Length: 26 min  Mode of Treatment: co-treatment  Reason for co-treatment: d/c planning, OT assessing ADLs    General Observations  Patient received upright, in chair. She was agreeable to therapy, no issues or concerns identified by nurse prior to session. PT present    Precautions: fall        Services  Do You Speak a Language Other Than English at Home?: no      OT Eval and Treat - 06/04/25 1105          Cognition    Orientation Status oriented x 4     Affect/Mental Status WFL;flat/blunted affect     Follows Commands WFL     Cognitive Function WFL;safety deficit     Safety Deficit minimal deficit;insight into deficits/self-awareness;safety precautions awareness     Comment, Cognition flat affect but pleasant and cooperative. receptive to care provided     Cognitive Interventions/Strategies occupation/activity based interventions        Vision Assessment/Intervention    Vision Assessment WFL;corrective lenses full-time        Hearing Assessment    Hearing Status WFL        Sensory Assessment    Sensory Assessment sensation intact, upper extremities;sensation intact except     Sensation Impaired Location(s) left UE;right UE     Sensory Subjective Reports tingling        Upper Extremity Assessment    UE Assessment  ROM and Strength WFL        Motor Skills    Coordination bilateral;WFL;finger to nose        Bed Mobility    [place] not tested     Comment OOB on OT arrival        Mobility Belt    Mobility Belt Used During Session yes        Sit/Stand Transfer    Surface chair with arm rests     [place] minimum assist (75% or more patient effort);1 person assist     Safety/Cues verbal cues;sequencing;technique     Assistive Device walker, front-wheeled;none     Transfer Comments benefits from RW, noted posterior lean with transitioning requiring min A to correct        Toilet Transfer    Transfer Technique sit/stand     [place] minimum assist (75% or more patient effort)     Assistive Device walker, front-wheeled;grab bars/safety frame     Comment noted LOB during sit > stand transition requiring min A to correct        Functional Mobility    Distance in room/bathroom;hallway     Functional Mobility [place] close supervision     Safety/Cues verbal cues;technique;sequencing     Assistive Device walker, front-wheeled;none        Lower Body Dressing    Tasks socks     [place] close supervision     Safety/Cues verbal cues;compensatory techniques     Position supported sitting     Comment figure 4 tech        Grooming    Tasks washes, rinses and dries hands     [place] close supervision     Position supported standing     Comment standing with RW at sink side, noted increase nausea at end of task with dizziness/feeling unwell, chair brought into bathroom and patient safely sat. once sitting noted /88- RN notified/aware        Toileting    Tasks perform bladder hygiene;adjust/manage clothing     [place] close supervision     Position supported sitting     Adaptive Equipment none     Comment +voids, completes hygiene seated on toilet        Balance    Static Sitting Balance WFL;sitting in chair     Dynamic Sitting Balance mild impairment;sitting in chair     Sit to Stand Dynamic Balance mild  impairment     Static Standing Balance mild impairment;supported     Dynamic Standing Balance mild impairment;moderate impairment;supported     Comment, Balance RW vs no AD in stance-benefits from RW at this time.        Impairments/Safety Issues    Impairments Affecting Function balance;endurance/activity tolerance     Functional Endurance fair-                                              Education Documentation  Rehabilitation Therapy, taught by Bhavna Nunez OT at 6/4/2025  4:30 PM.  Learner: Patient  Readiness: Acceptance  Method: Explanation  Response: Verbalizes Understanding  Comment: OT POC, safety/pacing, seated ADLs,home safety, OOB with RN staff        Session Outcome  Patient reclined, in chair at end of session, all needs met, call light in reach, chair alarm on, personal items in reach. Nursing notified about patient's response to therapy/activity, change in vital signs, patient's performance, and patient's position.    AM-PAC™ - ADL (Current Function)     Putting on/taking off regular lower body clothing 3 - A Little   Bathing 3 - A Little   Toileting 3 - A Little   Putting on/taking off regular upper body clothing 3 - A Little   Help for taking care of personal grooming 3 - A Little   Eating meals 4 - None   AM-PAC™ ADL Score 19          ASSESSMENT AND PLAN     Progress Summary  OT eval complete.  ADL [facility] 19. Pt presents with dizziness and hypertension. During OT eval Pt completes STS with min A with and without RW. Pt completes functional mobility with close sup with and without RW-benefits from RW at this time. Pt requires min a for toilet tx. Pt completes toileting, sink side grooming and LB dressing with close sup. Pt with dizziness/nausea at end of session and high BP noted-RN aware.Pt would benefit from cont OT. Rec d/c home with increased A and home health when medically stable.    Patient/Family Therapy Goal Statement: to feel better    OT Plan      Flowsheet Row Most Recent Value    Rehab Potential good, to achieve stated therapy goals at 06/04/2025 1105   Therapy Frequency 4 times/wk at 06/04/2025 1105   Planned Therapy Interventions activity tolerance training, BADL retraining, functional balance retraining, occupation/activity based interventions, ROM/therapeutic exercise, strengthening exercise, transfer/mobility retraining at 06/04/2025 1105       OT Discharge Recommendations      Flowsheet Row Most Recent Value   OT Recommended Discharge Disposition home with assistance, home with home health at 06/04/2025 1105   Anticipated Equipment Needs if Discharged Home (OT) walker, front-wheeled  [owns a RW but it is in [place]] at 06/04/2025 1105            OT Goals      Flowsheet Row Most Recent Value   Bed Mobility Goal 1    Activity/Assistive Device bed mobility activities, all at 06/04/2025 1105   [place] modified independence at 06/04/2025 1105   Time Frame by discharge at 06/04/2025 1105   Progress/Outcome goal ongoing at 06/04/2025 1105   Transfer Goal 1    Activity/Assistive Device toilet at 06/04/2025 1105   [place] modified independence at 06/04/2025 1105   Time Frame by discharge at 06/04/2025 1105   Progress/Outcome goal ongoing at 06/04/2025 1105   Dressing Goal 1    Activity/Adaptive Equipment dressing skills, all at 06/04/2025 1105   [place] modified independence at 06/04/2025 1105   Time Frame by discharge at 06/04/2025 1105   Progress/Outcome goal ongoing at 06/04/2025 1105   Toileting Goal 1    Activity/Assistive Device toileting skills, all at 06/04/2025 1105   [place] modified independence at 06/04/2025 1105   Time Frame by discharge at 06/04/2025 1105   Progress/Outcome goal ongoing at 06/04/2025 1105

## 2025-06-04 NOTE — HOSPITAL COURSE
Lanny is a 80 y.o. female admitted on 6/3/2025 with TIA (transient ischemic attack) [G45.9]  Dizziness [R42]. Principal problem is TIA (transient ischemic attack).    Past Medical History  Lanny has a past medical history of CVID (common variable immunodeficiency) (CMS/HCC), Lipid disorder, and Type 2 diabetes mellitus (CMS/HCC).    History of Present Illness   Presents with dizziness, blurred vision, impaired balance, HA, N, speech/ comprehensive difficulty, b/l hand tingling. CTH shows no e/o acute intracranial hemorrhage, mass effect or large acute territorial infarction by CT criteria. Possible abnormal appearance of the calvarium/osseous structures either due to significant demineralization or underlying neoplastic process such as myeloma or metastatic diseas

## 2025-06-04 NOTE — H&P
Hospital Medicine    History & Physical        CHIEF COMPLAINT   Dizziness   HISTORY OF PRESENT ILLNESS   Lanny Rey is a 80 y.o. female with a past medical history of chronic lymphoid leukemia, type 2 diabetes mellitus, hyperlipidemia, MGUS, common variable immunodeficiency, prior colon cancer, prior breast cancer, prior squamous cell carcinoma of the skin, prior malignant melanoma of the skin, and prior PFO with surgical repair who presents to the emergency department for evaluation of dizziness.  According to the patient, she developed dizziness as well as difficulty reading a book due to blurred vision. This would wax and wane throughout the day.  Patient later developed impaired balance and almost fell over in her home.  This is associated with mild aphasia as well as a headache, nausea, and tingling in the bilateral hands.  The patient denies any similar events in the past or recent illnesses.  The patient denies starting or stopping any medications recently.    Upon presentation to the emergency department, the patient was hypertensive at 194/93 with a maximum blood pressure of 210/95.  EKG showed sinus rhythm at 83 bpm without signs of acute ischemia. CT head did not show any evidence of an acute intracranial hemorrhage, mass effect, or large territorial infarct. Incidentally, the patient was found to have innumerable punctate lucencies in the osseous structures; it is unclear if this is related to demineralization or an underlying multifocal neoplastic process.  Laboratory studies were unremarkable.    At this time, the patient will be admitted to the hospital for further assessment of her neurologic symptoms as well as abnormal imaging findings.  Neurology has been consulted for additional recommendations.   PAST MEDICAL AND SURGICAL HISTORY   Past Medical History:   Diagnosis Date    CVID (common variable immunodeficiency) (CMS/HCC)     Lipid disorder     Type 2 diabetes mellitus  (CMS/[facility])        No past surgical history on file.    PCP: Lombardo Paz, Gina   MEDICATIONS   Prior to Admission medications   Medication Sig Start Date End Date Taking? Authorizing Provider   cyanocobalamin, vitamin B-12, 1,000 mcg capsule Take 1 capsule every day by oral route.   Yes Benita Loera MD   zanubrutinib (BRUKINSA) 80 mg capsule Take 160 mg by mouth 2 (two) times a day.   Yes Benita Loera MD   acetaminophen (TYLENOL) 325 mg tablet 2 tablets every 6 hours by oral route.    ProviderBenita MD   brompheniramine-pseudoeph-DM (BROMFED DM) 2-30-10 mg/5 mL syrup     Benita Loera MD   dulaglutide (TRULICITY) 1.5 mg/0.5 mL pen injector INJECT THE CONTENTS OF ONE PEN UNDER THE SKIN WEEKLY ON THE SAME DAY EACH WEEK    ProviderBenita MD   fenofibrate (TRIGLIDE) 160 mg tablet Take 160 mg by mouth daily.    ProviderBenita MD   immun glob G,IgG,-gly-IgA ov50 (BIVIGAM) 10 % solution 300 mg/kg.    Benita Loera MD   metFORMIN (GLUCOPHAGE) 850 mg tablet Take 1 tablet every day by oral route in the evening.    Benita Loera MD   multivit-min-ferrous fumarate 9 mg iron/15 mL liquid Multi Vitamin   1 qd    Benita Loera MD   simvastatin (ZOCOR) 20 mg tablet Take 20 mg by mouth daily.    ProviderBenita MD      ALLERGIES   Celebrex [celecoxib] and Penicillins   FAMILY HISTORY   No family history on file.   SOCIAL HISTORY   Social History     Substance and Sexual Activity   Drug Use Never     Tobacco Use: Medium Risk (6/3/2025)    Patient History     Smoking Tobacco Use: Former     Smokeless Tobacco Use: Never     Passive Exposure: Not on file     Alcohol Use: Not on file     Within the past week have you used heroin or used opioid medications other than as prescribed? (OxyContin, Fentanyl, Subutex): No  Do you get sick if you cannot use heroin, methadone, or opioid medications?: No          REVIEW OF SYSTEMS    Review of Systems   Constitutional:  Negative for chills, fatigue and fever.   HENT: Negative.     Eyes:  Positive for visual disturbance (Transient blurred vision).   Respiratory: Negative.     Cardiovascular: Negative.    Gastrointestinal:  Positive for nausea. Negative for vomiting.   Genitourinary: Negative.    Musculoskeletal: Negative.    Skin: Negative.    Neurological:  Positive for speech difficulty and light-headedness. Negative for weakness.        Tingling in the bilateral hands   Hematological:  Does not bruise/bleed easily.   Psychiatric/Behavioral: Negative.          PHYSICAL EXAMINATION   Temp:  [36.7 °C (98.1 °F)] 36.7 °C (98.1 °F)  Heart Rate:  [82-94] 83  Resp:  [16-22] 16  BP: (179-210)/(81-95) 179/81  Body mass index is 25.51 kg/m².    Physical Exam  Constitutional:       General: She is not in acute distress.     Appearance: She is ill-appearing.   HENT:      Head: Normocephalic and atraumatic.      Nose: No congestion or rhinorrhea.   Eyes:      General: No scleral icterus.     Conjunctiva/sclera: Conjunctivae normal.   Cardiovascular:      Rate and Rhythm: Normal rate and regular rhythm.      Heart sounds: No murmur heard.     No gallop.   Pulmonary:      Breath sounds: No wheezing, rhonchi or rales.   Abdominal:      General: Abdomen is flat.      Palpations: Abdomen is soft.   Musculoskeletal:      Right lower leg: No edema.      Left lower leg: No edema.   Skin:     General: Skin is warm and dry.   Neurological:      General: No focal deficit present.      Mental Status: She is alert and oriented to person, place, and time.      Comments: Mildly decreased left upper extremity strength secondary to pain.        LABS / IMAGING / EKG   CHEMISTRIES   Results from last 7 days   Lab Units 06/03/25  1915   SODIUM mEQ/L 137   POTASSIUM mEQ/L 3.8   CHLORIDE mEQ/L 104   CO2 mEQ/L 26   BUN mg/dL 19   CREATININE mg/dL 0.8   GLUCOSE mg/dL 171*   CALCIUM mg/dL 10.1   EGFR mL/min/1.73m*2 >60.0   ANION  GAP mEQ/L 7     CBC RESULTS   Results from last 7 days   Lab Units 06/03/25  1915   WBC K/uL 5.84   HEMOGLOBIN g/dL 13.0   HEMATOCRIT % 37.7   PLATELETS K/uL 207       Radiology Reports in last 24hCT HEAD WITHOUT IV CONTRAST  Result Date: 6/3/2025  CLINICAL HISTORY: Dizziness, persistent, recurring Headache, new or worsening (Age >= 50y)     IMPRESSION: No evidence of acute intracranial hemorrhage, mass effect or large acute territorial infarction by CT criteria.. Possible abnormal appearance of the calvarium/osseous structures either due to significant demineralization or underlying neoplastic process such as myeloma or metastatic disease.  Follow-up bone scan as well as further clinical workup suggested.  Follow-up brain MRI may also be helpful to evaluate calvarial marrow signal. COMMENT: Comparison: None TECHNIQUE: CT of the brain was performed without intravenous contrast. CT DOSE:  One or more dose reduction techniques (e.g. automated exposure control, adjustment of the mA and/or kV according to patient size, use of iterative reconstruction technique) utilized for this examination. FINDINGS: Ventricles, sulci and basal cisterns: Prominent compatible with cerebral volume loss without hydrocephalus Parenchyma: Patchy nonspecific probably microangiopathic white matter disease. No evidence of acute intracranial hemorrhage.  No mass effect or cerebral edema. No CT evidence of large acute territorial infarction. Extra-axial spaces: No extra-axial fluid collection. Imaged paranasal sinuses and mastoids: No significant opacities. Calvarium: Innumerable punctate lucencies in the osseous structures uncertain if this is related to demineralization or if there is underlying multifocal neoplastic process. Extra calvarial structures:Lens replacements. Miscellaneous: Atherosclerotic vascular calcifications.  Partially empty sella.     EKG: Sinus rhythm at 83 bpm without signs of acute ischemia.   ASSESSMENT AND PLAN     #  TIA versus RPLS  - glucose range: 147-171 (since 6/3/25)  - hemoglobin: 13 (6/3/25)  - platelets: 207 (6/3/25)  -   Symptoms began at approximately 10 AM on the morning of 6/3/2025 and manifested as blurred vision, dizziness/impaired balance, mild  aphasia, headache, nausea, and tingling in the bilateral hands.  - Possibly triggered by severely elevated blood pressure.  - CT head does not show any evidence of an acute intracranial hemorrhage, mass effect, or large territorial infarct.  - Aspirin not given due to documented celecoxib allergy with unknown reaction per the patient.  - MRI brain pending.  - Continue permissive hypertension.  - Neurochecks and NIHSS per stroke protocol.  - Neurology consulted.     # Hypertensive urgency  # Hypertension  - 24hr BP range: (179-210) / (81-95) (6/3/25)  - SBP range: 179 mmHg-210 mmHg, DBP range: 81 mmHg-95 mmHg (since 6/3/25)  - Hydralazine as needed for systolic blood pressure greater than 180 mmHg.  - Maintain permissive hypertension in the setting of possible CVA.     # Abnormal Imaging Finding  - CT of the head incidentally showed innumerable punctate lucencies in the osseous structures; it is unclear if this is related to demineralization or an underlying multifocal neoplastic process.  - MRI brain pending.  - Consider hematology/oncology consultation based on MRI results.     # Chronic lymphoid leukemia  - Chronic condition.  - Continue home regimen of zanubrutinib (patient supplied).     # Hyperlipidemia  - continue home: fenofibrate 200 mg po daily  - start: atorvastatin 10 mg po daily (due 06/04/25) in place of nonformulary simvastatin.     # Type 2 diabetes mellitus  - holding home: dulaglutide 3 mg/mL 1.5 mg sc  - start: insulin lispro 3 - 5 units sc TID (due 06/04/25)  - carbohydrate controlled diet while in the hospital.     ::: Stable/Chronic/Resolved :::     # Common variable immunodeficiency    # MGUS     # History of colon cancer     # History of breast  cancer     # History of squamous cell carcinoma of the skin     # History of malignant melanoma of the skin     # History of PFO with surgical repair           VTE Assessment: [place] VTE Score: 8  VTE Prophylaxis: Current anticoagulants:  enoxaparin (LOVENOX) syringe 40 mg, subcutaneous, Daily (6p)      Code Status: Full Code  Estimated Discharge Date: 2025  Disposition [id number]     Nestor Castellanos DO  2025

## 2025-06-04 NOTE — ED ATTESTATION NOTE
Procedures  Physical Exam  Review of Systems    6/4/202512:11 AM  I have personally seen and examined the patient.  I reviewed and agree with the PA/NP/Resident's assessment and plan of care.    Vital Signs Review: Vital signs have been reviewed. The oxygen saturation is  SpO2: 96 % which is  Normal    My examination, assessment, and plan of care of Lanny Rey is as follows:      Patient Presents with patient is an 80-year-old female coming in today for an episode of dizziness that started around this morning states that the room was spinning she had slight facial surgeons headache and nausea but no fevers no chills no numbness no weakness    Exam: well appearing, alert, lungs ctab with no distress, and frequent fingerstick.  Cranial nerves II through XII intact gross motor function intact patient has intact finger-nose testing      Impression/Plan: Patient comes in with signs and symptoms concerning for intermittent dizziness mild lightheadedness fatigue workup is otherwise unremarkable CT scan.  With elevated blood pressure feel the patient would benefit from admission for further TIA stroke workup.  Aspirin given      Please refer to work-up tab for further management and follow-up on laboratory and imaging evaluations    ED Course as of 06/04/25 0013   Tue Jun 03, 2025 1952 Impression: dizziness, headache, nausea, fatigue    Plan: labs, trop, ua, EKG, ct head, monitor [SG]   2131 BP(!)(S): 210/95  Will treat with permissive htn at this time [SG]   2142 CT HEAD WITHOUT IV CONTRAST  IMPRESSION:  No evidence of acute intracranial hemorrhage, mass effect or large acute territorial infarction by CT criteria..  Possible abnormal appearance of the calvarium/osseous structures either due to  significant demineralization or underlying neoplastic process such as myeloma or  metastatic disease.  Follow-up bone scan as well as further clinical workup suggested.  Follow-up brain MRI may also be helpful to evaluate  calvarial marrow signal. [SG]   2223 BP(!): 179/81 [SG]   2243 HMS paged [SG]   2253 Case was discussed with hospitalist, Dr Castellanos.  Reviewed patient's presentation, ED course, and relevant data.  Hospitalist accepts patient on their service and will see / admit pt. [SG]   Wed Jun 04, 2025   0013 Aspirin not given due to concerns for allergy to NSAIDs patient cannot clarify if she can tolerate aspirin [RW]      ED Course User Index  [RW] Miguel Angel Jung MD  [SG] Lynnette Up PA C       I was physically present for the key/critical portions of the procedures documented by KAMALA    This document was created using dragon dictation software.  There might be some typographical errors due to this technology.       Miguel Angel Jung MD  06/04/25 0012

## 2025-06-04 NOTE — PLAN OF CARE
Problem: Adult Inpatient Plan of Care  Goal: Plan of Care Review  Outcome: Progressing  Flowsheets (Taken 6/4/2025 0339)  Progress: improving  Outcome Evaluation: OT eval complete  Plan of Care Reviewed With: patient     Problem: Self-Care Deficit  Goal: Improved Ability to Complete Activities of Daily Living  Outcome: Progressing

## 2025-06-04 NOTE — CONSULTS
"  Neurology Consult Note        Subjective     Lanny Rey is a right handed 80 y.o. female who medical history for CLL, DM, hyperlipidemia, MGUS, colon cancer, breast cancer, melanoma, PFO with closure comes to the hospital with persistent dizziness and blurred vision.      Patient herself reported she got up out of bed at 10 AM yesterday morning feeling very tired.  She had a few days of very busy traveling from [place], and was feeling exhausted.  Patient reported after waking up yesterday, she was having difficulty with her vision that was described as blurry vision, then got up and felt better.  Then 30 minutes later she had another episode of blurry vision where she could not make out what she was seeing.  She then had breakfast, went over to sit in a chair when she fell forward losing her balance.  Then 20 minutes later went into the bathroom and her head was spinning, felt unsteady and needing to hold on.  When she was in a seated position while in the bathroom she continued to have a spinning sensation.  Overall spinning sensation began to improve in about an hour.  She still is reporting spinning sensation when getting up.  She denies nausea or vomiting with symptoms.  Family took her blood pressure at home and it was reported to be 170/84 when it is typically 120/64.  She also describes her vision as being \"way off \"when she went to read, occultly making out the words.  Patient denies speech difficulties or understanding speech but also reported having trouble with a brief episode of comprehending yesterday.  She had similar symptoms again later in the day around 6 PM.  While in the CAT scan she had another episode, and he had another episode when getting to the commode after that.  Patient is reporting a frontal headache initial pain scale 5-6/10 now 3/10.  Patient reports she is currently being treated for \"blood cancer \"on chemotherapy on Brukinsa.  She has a history of breast cancer 2018 treated " with lumpectomy and radiation, and also skin cancer.    CT head has reported an abnormal appearance of the calvarium/osseous structures either due to significant demineralization or underlying neoplastic process such as myeloma or metastatic disease.  Otherwise no evidence of acute intracranial hemorrhage, mass effect or infarct.      Medical History:   Past Medical History:   Diagnosis Date    CVID (common variable immunodeficiency) (CMS/HCC)     Lipid disorder     Type 2 diabetes mellitus (CMS/HCC)        Surgical History: No past surgical history on file.    Allergies: Celebrex [celecoxib] and Penicillins    Scheduled Medications   atorvastatin  10 mg oral Daily    calcium (as carbonate)  250 mg of elemental calcium oral Daily    cyanocobalamin  2,000 mcg oral Daily    enoxaparin  40 mg subcutaneous Daily (6p)    famotidine  10 mg oral q AM    fenofibrate micronized  200 mg oral Daily with breakfast    insulin lispro U-100  3-5 Units subcutaneous TID with meals    multivitamin  1 tablet oral Daily    zanubrutinib  160 mg oral BID       Home Medications   acetaminophen (TYLENOL) tablet 650 mg  650 mg oral q4h PRN    atorvastatin (LIPITOR) tablet 10 mg  10 mg oral Daily    calcium (as carbonate) suspension 250 mg of elemental calcium  250 mg of elemental calcium oral Daily    cyanocobalamin (VITAMIN B12) tablet 2,000 mcg  2,000 mcg oral Daily    glucose chewable tablet 15-30 g of dextrose  15-30 g of dextrose oral PRN    Or    dextrose 40 % oral gel 15-30 g of dextrose  15-30 g of dextrose oral PRN    Or    glucagon (GLUCAGEN) injection 1 mg  1 mg intramuscular PRN    Or    dextrose 50 % in water (D50) injection 12.5 g  25 mL intravenous PRN    enoxaparin (LOVENOX) syringe 40 mg  40 mg subcutaneous Daily (6p)    famotidine (PEPCID) tablet 10 mg  10 mg oral q AM    fenofibrate micronized (LOFIBRA) capsule 200 mg  200 mg oral Daily with breakfast    hydrALAZINE (APRESOLINE) injection 10 mg  10 mg intravenous q6h PRN     insulin lispro U-100 (HumaLOG) pen 3-5 Units  3-5 Units subcutaneous TID with meals    multivitamin tablet 1 tablet  1 tablet oral Daily    zanubrutinib 160 mg  160 mg oral BID       Family History: No family history on file.    Social History:   Social History     Socioeconomic History    Marital status:      Spouse name: None    Number of children: None    Years of education: None    Highest education level: None   Tobacco Use    Smoking status: Former     Types: Cigarettes    Smokeless tobacco: Never   Substance and Sexual Activity    Alcohol use: Never    Drug use: Never    Sexual activity: Defer     Social Drivers of Health     Food Insecurity: No Food Insecurity (6/3/2025)    Hunger Vital Sign     Worried About Running Out of Food in the Last Year: Never true     Ran Out of Food in the Last Year: Never true       Review of Systems  All other systems reviewed and negative except as noted in the HPI.        Objective     Physical Exam  Temperature: Temp (24hrs), Av.7 °C (98.1 °F), Min:36.4 °C (97.6 °F), Max:36.8 °C (98.3 °F)     BP Max:  Systolic (24hrs), Av , Min:135 , Max:210      BP Hall:  Diastolic (24hrs), Av, Min:65, Max:95    Recent:  Patient Vitals for the past 4 hrs:   BP Temp Temp src Pulse Resp SpO2   25 0700 (!) 142/65 36.8 °C (98.3 °F) Oral 89 18 95 %   25 0500 135/83 36.8 °C (98.3 °F) Oral 90 16 96 %          Neurological Examination  Patient is awake alert and fully oriented.    There is no aphasia.   There is no dysarthria.   DTR's:   Upper extremities 1+  Lower extremities 1+    Cranial nerves :  Pupils are equal, round, and reactive to light and accommodation bilaterally.    Eye movements are full without nystagmus.   Visual fields are full.    Facial sensation is intact bilaterally    Facial strength is normal.     The tongue protrusion is midline.       Motor:   RUE 5/5  LUE 5/5  RLE 5/5  LLE5/5     Sensory exam:  Sensation is intact to light touch on the  upper and lower extremities.     Cerebellar:  Finger to Nose and Heel to shin intact bilaterally    NIH Stroke Scale    Interval: Baseline  Time:   Person Administering Scale: ALETHA Ayala    1a  Level of consciousness: 0=alert; keenly responsive   1b. LOC questions:  0=Performs both tasks correctly   1c. LOC commands: 0=Performs both tasks correctly   2.  Best Gaze: 0=normal   3. Visual: 0=No visual loss   4. Facial Palsy: 0=Normal symmetric movement   5a.  Motor left arm: 0=No drift, limb holds 90 (or 45) degrees for full 10 seconds   5b.  Motor right arm: 0=No drift, limb holds 90 (or 45) degrees for full 10 seconds   6a. motor left le=No drift, limb holds 90 (or 45) degrees for full 10 seconds   6b  Motor right le=No drift, limb holds 90 (or 45) degrees for full 10 seconds   7. Limb Ataxia: 0=Absent   8.  Sensory: 0=Normal; no sensory loss   9. Best Language:  0=No aphasia, normal   10. Dysarthria: 0=Normal   11. Extinction and Inattention: 0=No abnormality    Total:   0       Labs  Recent Results (from the past 24 hours)   Comprehensive metabolic panel    Collection Time: 25  7:15 PM   Result Value Ref Range    Sodium 137 136 - 145 mEQ/L    Potassium 3.8 3.5 - 5.1 mEQ/L    Chloride 104 98 - 107 mEQ/L    CO2 26 21 - 31 mEQ/L    BUN 19 7 - 25 mg/dL    Creatinine 0.8 0.6 - 1.2 mg/dL    Glucose 171 (H) 70 - 99 mg/dL    Calcium 10.1 8.6 - 10.3 mg/dL    AST (SGOT) 16 13 - 39 IU/L    ALT (SGPT) 15 7 - 52 IU/L    Alkaline Phosphatase 43 34 - 125 IU/L    Total Protein 7.7 6.0 - 8.2 g/dL    Albumin 4.6 3.5 - 5.7 g/dL    Bilirubin, Total 0.4 0.3 - 1.2 mg/dL    eGFR >60.0 >=60.0 mL/min/1.73m*2    Anion Gap 7 3 - 15 mEQ/L   CBC and differential    Collection Time: 25  7:15 PM   Result Value Ref Range    WBC 5.84 3.80 - 10.50 K/uL    RBC 3.73 (L) 3.93 - 5.22 M/uL    Hemoglobin 13.0 11.8 - 15.7 g/dL    Hematocrit 37.7 35.0 - 45.0 %    .1 (H) 83.0 - 98.0 fL    MCH 34.9 (H) 28.0 - 33.2 pg     MCHC 34.5 32.2 - 35.5 g/dL    RDW 12.2 11.7 - 14.4 %    Platelets 207 150 - 369 K/uL    MPV 10.5 9.4 - 12.3 fL    Differential Type Auto     nRBC 0.0 <=0.0 %    Immature Granulocytes 0.2 %    Neutrophils 46.8 %    Lymphocytes 42.5 %    Monocytes 8.9 %    Eosinophils 0.9 %    Basophils 0.7 %    Immature Granulocytes, Absolute 0.01 0.00 - 0.08 K/uL    Neutrophils, Absolute 2.74 1.70 - 7.00 K/uL    Lymphocytes, Absolute 2.48 1.20 - 3.50 K/uL    Monocytes, Absolute 0.52 0.28 - 0.80 K/uL    Eosinophils, Absolute 0.05 0.04 - 0.36 K/uL    Basophils, Absolute 0.04 0.01 - 0.10 K/uL   HS Troponin (with 2 hour reflex)    Collection Time: 06/03/25  7:15 PM   Result Value Ref Range    High Sens Troponin I 8.3 <15.0 pg/mL   POCT Glucose    Collection Time: 06/03/25  7:15 PM   Result Value Ref Range    POCT Bedside Glucose 147 (H) 70 - 99 mg/dL    POC Test POC    ECG 12 lead    Collection Time: 06/03/25  7:19 PM   Result Value Ref Range    Ventricular rate 83     Atrial rate 83     WY Interval 188     QRS duration 70     QT Interval 376     QTC Calculation(Bazett) 441     P Axis 34     R Axis -16     T Wave Axis 22    UA Reflex to Culture (Macroscopic)    Collection Time: 06/03/25  8:25 PM    Specimen: Urine, Clean Catch   Result Value Ref Range    Color, Urine Colorless Yellow, Colorless    Clarity, Urine Clear Clear    Specific Gravity, Urine 1.006 1.005 - 1.030    pH, Urine 6.5 4.5 - 8.0    Leukocyte Esterase Negative Negative    Nitrite, Urine Negative Negative    Protein, Urine Negative Negative    Glucose, Urine Negative Negative mg/dL    Ketones, Urine Negative Negative mg/dL    Urobilinogen, Urine 0.2 <2.0 EU/dL EU/dL    Bilirubin, Urine Negative Negative mg/dL    Blood, Urine +1 (A) Negative   UA Microscopic    Collection Time: 06/03/25  8:25 PM   Result Value Ref Range    RBC, Urine 0 TO 4 0 TO 4 /HPF    WBC, Urine 0 TO 3 0 TO 3 /HPF    Squamous Epithelial None Seen None Seen /hpf    Hyaline Cast None Seen None Seen /lpf     Bacteria, Urine None Seen None Seen /HPF   HIGH SENSITIVE TROPONIN I (NO REFLEX)    Collection Time: 06/03/25  9:29 PM   Result Value Ref Range    High Sens Troponin I 8.1 <15.0 pg/mL   Lipid panel    Collection Time: 06/04/25  5:10 AM   Result Value Ref Range    Triglycerides 146 <150 mg/dL    Cholesterol 159 <=200 mg/dL    HDL 68 >=50 mg/dL    LDL Calculated 62 <=100 mg/dL    Non-HDL, Calculated 91 mg/dL    RISK 2.3 <=5.0   CBC    Collection Time: 06/04/25  5:10 AM   Result Value Ref Range    WBC 6.13 3.80 - 10.50 K/uL    RBC 3.77 (L) 3.93 - 5.22 M/uL    Hemoglobin 13.2 11.8 - 15.7 g/dL    Hematocrit 38.1 35.0 - 45.0 %    .1 (H) 83.0 - 98.0 fL    MCH 35.0 (H) 28.0 - 33.2 pg    MCHC 34.6 32.2 - 35.5 g/dL    RDW 12.4 11.7 - 14.4 %    Platelets 203 150 - 369 K/uL    MPV 10.6 9.4 - 12.3 fL   Basic metabolic panel    Collection Time: 06/04/25  5:10 AM   Result Value Ref Range    Sodium 140 136 - 145 mEQ/L    Potassium 3.6 3.5 - 5.1 mEQ/L    Chloride 108 (H) 98 - 107 mEQ/L    CO2 25 21 - 31 mEQ/L    BUN 18 7 - 25 mg/dL    Creatinine 0.9 0.6 - 1.2 mg/dL    Glucose 177 (H) 70 - 99 mg/dL    Calcium 9.7 8.6 - 10.3 mg/dL    eGFR >60.0 >=60.0 mL/min/1.73m*2    Anion Gap 7 3 - 15 mEQ/L   POCT Glucose    Collection Time: 06/04/25  7:56 AM   Result Value Ref Range    POCT Bedside Glucose 169 (H) 70 - 99 mg/dL    POC Test POC    ]      Imaging  CT HEAD WITHOUT IV CONTRAST  Result Date: 6/3/2025  CLINICAL HISTORY: Dizziness, persistent, recurring Headache, new or worsening (Age >= 50y)     IMPRESSION: No evidence of acute intracranial hemorrhage, mass effect or large acute territorial infarction by CT criteria.. Possible abnormal appearance of the calvarium/osseous structures either due to significant demineralization or underlying neoplastic process such as myeloma or metastatic disease.  Follow-up bone scan as well as further clinical workup suggested.  Follow-up brain MRI may also be helpful to evaluate calvarial  marrow signal. COMMENT: Comparison: None TECHNIQUE: CT of the brain was performed without intravenous contrast. CT DOSE:  One or more dose reduction techniques (e.g. automated exposure control, adjustment of the mA and/or kV according to patient size, use of iterative reconstruction technique) utilized for this examination. FINDINGS: Ventricles, sulci and basal cisterns: Prominent compatible with cerebral volume loss without hydrocephalus Parenchyma: Patchy nonspecific probably microangiopathic white matter disease. No evidence of acute intracranial hemorrhage.  No mass effect or cerebral edema. No CT evidence of large acute territorial infarction. Extra-axial spaces: No extra-axial fluid collection. Imaged paranasal sinuses and mastoids: No significant opacities. Calvarium: Innumerable punctate lucencies in the osseous structures uncertain if this is related to demineralization or if there is underlying multifocal neoplastic process. Extra calvarial structures:Lens replacements. Miscellaneous: Atherosclerotic vascular calcifications.  Partially empty sella.      Imaging reviewed myself    EKG      Assessment and Plan    In Summary:This is a right handed 80 y.o. female who medical history for CLL, DM, hyperlipidemia, MGUS, colon cancer, breast cancer, melanoma, PFO with closure comes to the hospital with persistent dizziness and blurred vision.     Patient had several episodes of dizziness and blurred vision.  Dizziness described as unsteadiness, there was no spinning sensation.  Having difficulty reading words.  Is completely asymptomatic this morning, NIH 0.    Dizziness  Patient had several episodes of dizziness, no spinning sensation, unlikely to be vertigo. Stroke and hypertensive encephalopathy are possibilities.    Recommendations  -MRI brain w/wo contrast to r/o stroke. Contrast was added due to CT scan abnormality and history of cancers  -Start aspirin 81 mg daily  -Lipi  -Gradual normalization of blood  pressure  - PT/OT  -Neuro checks/NIH       Patient was seen and examined at bedside,  Plan discussed with attending Neurologist  Medical management and supportive care to continue by primary service.  Thank you for allowing me to participate in the care of this patient. If you have any further questions, please do not hesitate to contact me.    ALETHA Douglass  8:35 AM

## 2025-06-05 VITALS
RESPIRATION RATE: 18 BRPM | SYSTOLIC BLOOD PRESSURE: 140 MMHG | HEIGHT: 61 IN | TEMPERATURE: 97.7 F | OXYGEN SATURATION: 96 % | DIASTOLIC BLOOD PRESSURE: 77 MMHG | WEIGHT: 135 LBS | HEART RATE: 86 BPM | BODY MASS INDEX: 25.49 KG/M2

## 2025-06-05 PROBLEM — H81.10 BENIGN PAROXYSMAL POSITIONAL VERTIGO: Status: ACTIVE | Noted: 2025-06-05

## 2025-06-05 LAB
ANION GAP SERPL CALC-SCNC: 6 MEQ/L (ref 3–15)
BUN SERPL-MCNC: 21 MG/DL (ref 7–25)
CALCIUM SERPL-MCNC: 9.9 MG/DL (ref 8.6–10.3)
CHLORIDE SERPL-SCNC: 107 MEQ/L (ref 98–107)
CO2 SERPL-SCNC: 26 MEQ/L (ref 21–31)
CREAT SERPL-MCNC: 1 MG/DL (ref 0.6–1.2)
EGFRCR SERPLBLD CKD-EPI 2021: 57.1 ML/MIN/1.73M*2
ERYTHROCYTE [DISTWIDTH] IN BLOOD BY AUTOMATED COUNT: 12.6 % (ref 11.7–14.4)
GLUCOSE BLD-MCNC: 193 MG/DL (ref 70–99)
GLUCOSE BLD-MCNC: 215 MG/DL (ref 70–99)
GLUCOSE SERPL-MCNC: 182 MG/DL (ref 70–99)
HCT VFR BLD AUTO: 38.2 % (ref 35–45)
HGB BLD-MCNC: 13.1 G/DL (ref 11.8–15.7)
MCH RBC QN AUTO: 34.7 PG (ref 28–33.2)
MCHC RBC AUTO-ENTMCNC: 34.3 G/DL (ref 32.2–35.5)
MCV RBC AUTO: 101.3 FL (ref 83–98)
PLATELET # BLD AUTO: 202 K/UL (ref 150–369)
PMV BLD AUTO: 10.6 FL (ref 9.4–12.3)
POCT TEST: ABNORMAL
POCT TEST: ABNORMAL
POTASSIUM SERPL-SCNC: 4.2 MEQ/L (ref 3.5–5.1)
RBC # BLD AUTO: 3.77 M/UL (ref 3.93–5.22)
SODIUM SERPL-SCNC: 139 MEQ/L (ref 136–145)
WBC # BLD AUTO: 5.1 K/UL (ref 3.8–10.5)

## 2025-06-05 PROCEDURE — 85027 COMPLETE CBC AUTOMATED: CPT | Performed by: STUDENT IN AN ORGANIZED HEALTH CARE EDUCATION/TRAINING PROGRAM

## 2025-06-05 PROCEDURE — 36415 COLL VENOUS BLD VENIPUNCTURE: CPT | Performed by: STUDENT IN AN ORGANIZED HEALTH CARE EDUCATION/TRAINING PROGRAM

## 2025-06-05 PROCEDURE — 99233 SBSQ HOSP IP/OBS HIGH 50: CPT | Performed by: NURSE PRACTITIONER

## 2025-06-05 PROCEDURE — 80048 BASIC METABOLIC PNL TOTAL CA: CPT | Performed by: STUDENT IN AN ORGANIZED HEALTH CARE EDUCATION/TRAINING PROGRAM

## 2025-06-05 PROCEDURE — 63700000 HC SELF-ADMINISTRABLE DRUG: Performed by: STUDENT IN AN ORGANIZED HEALTH CARE EDUCATION/TRAINING PROGRAM

## 2025-06-05 PROCEDURE — 97530 THERAPEUTIC ACTIVITIES: CPT | Mod: GP,CQ

## 2025-06-05 PROCEDURE — 63700000 HC SELF-ADMINISTRABLE DRUG: Performed by: NURSE PRACTITIONER

## 2025-06-05 PROCEDURE — 99239 HOSP IP/OBS DSCHRG MGMT >30: CPT | Mod: FS | Performed by: INTERNAL MEDICINE

## 2025-06-05 RX ORDER — AMLODIPINE BESYLATE 2.5 MG/1
2.5 TABLET ORAL DAILY
Qty: 30 TABLET | Refills: 0 | Status: SHIPPED | OUTPATIENT
Start: 2025-06-06 | End: 2025-07-06

## 2025-06-05 RX ORDER — AMLODIPINE BESYLATE 2.5 MG/1
2.5 TABLET ORAL DAILY
Status: DISCONTINUED | OUTPATIENT
Start: 2025-06-05 | End: 2025-06-05 | Stop reason: HOSPADM

## 2025-06-05 RX ORDER — ONDANSETRON 4 MG/1
4 TABLET, ORALLY DISINTEGRATING ORAL EVERY 8 HOURS PRN
Status: DISCONTINUED | OUTPATIENT
Start: 2025-06-05 | End: 2025-06-05 | Stop reason: HOSPADM

## 2025-06-05 RX ORDER — MECLIZINE HYDROCHLORIDE 25 MG/1
25 TABLET ORAL 3 TIMES DAILY PRN
Status: DISCONTINUED | OUTPATIENT
Start: 2025-06-05 | End: 2025-06-05 | Stop reason: HOSPADM

## 2025-06-05 RX ORDER — ONDANSETRON HYDROCHLORIDE 2 MG/ML
4 INJECTION, SOLUTION INTRAVENOUS EVERY 8 HOURS PRN
Status: DISCONTINUED | OUTPATIENT
Start: 2025-06-05 | End: 2025-06-05 | Stop reason: HOSPADM

## 2025-06-05 RX ORDER — MECLIZINE HYDROCHLORIDE 25 MG/1
25 TABLET ORAL 3 TIMES DAILY PRN
Qty: 20 TABLET | Refills: 0 | Status: SHIPPED | OUTPATIENT
Start: 2025-06-05 | End: 2025-07-05

## 2025-06-05 RX ADMIN — FAMOTIDINE 10 MG: 10 TABLET, FILM COATED ORAL at 08:50

## 2025-06-05 RX ADMIN — INSULIN LISPRO 3 UNITS: 100 INJECTION, SOLUTION INTRAVENOUS; SUBCUTANEOUS at 13:09

## 2025-06-05 RX ADMIN — CYANOCOBALAMIN TAB 1000 MCG 2000 MCG: 1000 TAB at 08:50

## 2025-06-05 RX ADMIN — FENOFIBRATE 200 MG: 200 CAPSULE ORAL at 08:50

## 2025-06-05 RX ADMIN — ONDANSETRON 4 MG: 4 TABLET, ORALLY DISINTEGRATING ORAL at 01:15

## 2025-06-05 RX ADMIN — THERA TABS 1 TABLET: TAB at 08:50

## 2025-06-05 RX ADMIN — ASPIRIN 81 MG CHEWABLE TABLET 81 MG: 81 TABLET CHEWABLE at 08:49

## 2025-06-05 RX ADMIN — ATORVASTATIN CALCIUM 10 MG: 10 TABLET, FILM COATED ORAL at 08:50

## 2025-06-05 RX ADMIN — INSULIN LISPRO 3 UNITS: 100 INJECTION, SOLUTION INTRAVENOUS; SUBCUTANEOUS at 08:55

## 2025-06-05 ASSESSMENT — COGNITIVE AND FUNCTIONAL STATUS - GENERAL
MOVING TO AND FROM BED TO CHAIR: 3 - A LITTLE
AFFECT: WFL;FLAT/BLUNTED AFFECT
CLIMB 3 TO 5 STEPS WITH RAILING: 3 - A LITTLE
MOVING TO AND FROM BED TO CHAIR: 3 - A LITTLE
WALKING IN HOSPITAL ROOM: 3 - A LITTLE
CLIMB 3 TO 5 STEPS WITH RAILING: 3 - A LITTLE
WALKING IN HOSPITAL ROOM: 3 - A LITTLE
STANDING UP FROM CHAIR USING ARMS: 3 - A LITTLE
STANDING UP FROM CHAIR USING ARMS: 3 - A LITTLE

## 2025-06-05 NOTE — PLAN OF CARE
Plan of Care Review  Plan of Care Reviewed With: patient, spouse  Progress: improving  Outcome Evaluation: Pt AAOx4. Patient stated that dizziness is much improved today. Patient d/c'd to home with disc of MRI and CT scan to take with her. IV removed, tele box returned to monitor room. Discharge instructions discussed with patient and spouse at bedside. Questions answered. No further needs at this time. Belongings returned, including chemo med in wall.

## 2025-06-05 NOTE — PLAN OF CARE
Plan of Care Review  Plan of Care Reviewed With: patient  Progress: improving  Outcome Evaluation: Patient alert and oriented. Patient experienced a 'dizzy spell' while sitting in bed associated with nausea; new order zofran with effect. VSS on room air. Out of bed to bathroom with one person assist and walker related to dizzyness / leg weakness episodes. Tylenol for headache with effect. 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 MRI pending. Neuro checks stable.      Problem: Stroke, Ischemic (Includes Transient Ischemic Attack)  Goal: Optimal Coping  Outcome: Progressing

## 2025-06-05 NOTE — DISCHARGE SUMMARY
Hospital Medicine    Inpatient Discharge Summary        BRIEF OVERVIEW   Patient: Lanny Rey  Admission Date: 6/3/2025   : 1945 Discharge Date: 2025       PCP: Lombardo Paz, Gina  Disposition: Home     Destination: Home     Attending Provider: Ibazebo, Ebehireme, MD Attending phys phone: [phone]    Code Status At Discharge: Full Code         ASSESSMENT AND PLAN   Problem List on the Day of Discharge  # Dizziness   - Presents with dizziness, blurred vision, impaired balance, HA, N, speech/ comprehensive difficulty, b/l hand tingling  - CTH shows no e/o acute intracranial hemorrhage, mass effect or large acute territorial infarction by CT criteria. Possible abnormal appearance of the calvarium/osseous structures either due to significant demineralization or underlying neoplastic process such as myeloma or metastatic disease. Follow-up bone scan as well as further clinical workup suggested.   - MRI brain shows no acute infarct, acute intracranial hemorrhage, abnormally enhancing intraparenchymal mass lesion or intra-axial mass effect. Tiny chronic right cerebellar infarcts, mild chronic microangiopathy and age-related involutional changes. Redemonstration of a partial empty sella, nonspecific. Heterogeneous T1 signal in the regional osseous structures without associated marrow edema on the T2 sequence.  This is of indeterminate etiology. This could be related to heterogeneous osseous demineralization, however, a marrow infiltrative process could have this appearance.  Clinical correlation is recommended.  Consider further evaluation with a nuclear medicine bone scan.  - Unclear etiology. Reports intermittent dizziness. Suspect vertigo +/- > hypertensive encepholopathy.  - Continue PRN meclizine and supportive mgmt for vertigo   - Start low dose amlodipine for HTN  - No need for further ASA per neurology. Continue statin, fenofibrate.  - Avoid hypotension, avoid dehydration  -  Continue neurochecks  - PT/OT consulted, cleared for home with assistance  - Neurology consult appreciated     # Hypertensive urgency  # Hypertension  - 24hr BP range: (118-168) / (63-87) (6/5/25)  - Start low dose amlodipine      # Hyperlipidemia: with clinical ASCVD  - lipid panel: total cholesterol: 159 / calculated LDL: 62 / HDL: 68 / triglycerides: 146 (6/4/25)  - Continue statin, fenofibrate     # Type 2 diabetes mellitus  - glucose range: 182-216 (since 6/4/25)  - hemoglobin A1c: 6.9 % (6/4/25)  - Continue SSI in the hospital  - Resume dulaglutide on discharge  - Diabetic diet     # Common variable immunodeficiency  # MGUS  # History of colon cancer  # History of breast cancer  # History of squamous cell carcinoma of the skin  # History of malignant melanoma of the skin  - CT shows possible abnormal appearance of the calvarium/osseous structures either due to significant demineralization or underlying neoplastic process such as myeloma or metastatic disease. Follow-up bone scan as well as further clinical workup suggested.  - MRI brain shows no acute infarct, acute intracranial hemorrhage, abnormally enhancing intraparenchymal mass lesion or intra-axial mass effect. Tiny chronic right cerebellar infarcts, mild chronic microangiopathy and age-related involutional changes. Redemonstration of a partial empty sella, nonspecific. Heterogeneous T1 signal in the regional osseous structures without associated marrow edema on the T2 sequence.  This is of indeterminate etiology. This could be related to heterogeneous osseous demineralization, however, a marrow infiltrative process could have this appearance.  Clinical correlation is recommended.  Consider further evaluation with a nuclear medicine bone scan.  - Labs are stable  - Continue zanubrutinib  - On bivigam at home  - F/u with heme/onc team in [place] and consider OP bone scan     # History of PFO with surgical repair  - No acute issues    Brief Hospital  Course  This is a 80 y.o. year-old female PMH CLL, DMII, HLD, MGUS, common variable immunodeficiency, prior colon cancer, prior breast cancer, prior squamous cell carcinoma of the skin, prior malignant melanoma of the skin, and prior PFO with surgical repair who presented to [facility] on 6/3/2025 with complaints of dizziness, blurred vision, impaired balance, HA, N, brief speech/ comprehensive difficulty, hand tingling. Pt was seen by neurology. MRI brain was negative for acute infarct; Noted tiny chronic R cerebellar infarcts (small and old, no need for ASA per neurology); Noted heterogeneous T1 signal in the regional osseous structures without associated marrow edema on the T2 sequence (provided MRI CD to pt for her heme/onc in [place] to review if bone scan is needed given hx ca). Etiology of sx thought to be vertigo +/- hypertensive encephalopathy with elevated BP noted at time of sx. Pt was tx with supportive mgmt including meclizine and amlodipine. Pt seen by PT/OT who rec home with assistance. Scripts provided for outpatient vestibular therapy as well. Today, pt is medically stable for d/c home with close f/u as directed.       Exam on Day of Discharge  Temp:  [36.5 °C (97.7 °F)-36.7 °C (98.1 °F)] 36.5 °C (97.7 °F)  Heart Rate:  [78-91] 86  Resp:  [18] 18  BP: (118-168)/(63-87) 140/77      Physical Exam  Vitals and nursing note reviewed.   Constitutional:       General: She is not in acute distress.  HENT:      Head: Normocephalic.   Eyes:      Pupils: Pupils are equal, round, and reactive to light.   Cardiovascular:      Rate and Rhythm: Normal rate and regular rhythm.   Pulmonary:      Effort: Pulmonary effort is normal.   Abdominal:      General: Abdomen is flat.   Musculoskeletal:         General: Normal range of motion.      Cervical back: Normal range of motion.   Skin:     General: Skin is dry.   Neurological:      General: No focal deficit present.      Mental Status: She is alert and oriented to  person, place, and time.   Psychiatric:         Mood and Affect: Mood normal.           DISCHARGE MEDICATIONS      Medication List        START taking these medications      amLODIPine 2.5 mg tablet  Commonly known as: NORVASC  Start taking on: June 6, 2025  Take 1 tablet (2.5 mg total) by mouth daily.  Dose: 2.5 mg     meclizine 25 mg tablet  Commonly known as: ANTIVERT  Take 1 tablet (25 mg total) by mouth 3 (three) times a day as needed for dizziness or nausea.  Dose: 25 mg            CONTINUE taking these medications      acetaminophen 500 mg capsule  Take 500 mg by mouth every morning.  Dose: 500 mg     biotin 5,000 mcg tablet,chewable  Take 5,000 mcg by mouth daily.  Dose: 5,000 mcg     BIVIGAM 10 % solution  300 mg/kg.  Dose: 300 mg/kg  Generic drug: immun glob G(IgG)-gly-IgA ov50     BRUKINSA 80 mg capsule  Take 160 mg by mouth 2 (two) times a day.  Dose: 160 mg  Generic drug: zanubrutinib     CALCIUM ORAL  Take 1 Dose by mouth once daily.  Dose: 1 Dose     cyanocobalamin (vitamin B-12) 5,000 mcg capsule  Take 5,000 mcg by mouth daily.  Dose: 5,000 mcg     famotidine 10 mg tablet  Commonly known as: PEPCID  Take 10 mg by mouth every morning.  Dose: 10 mg     fenofibrate 160 mg tablet  Commonly known as: TRIGLIDE  Take 160 mg by mouth every morning.  Dose: 160 mg     multivitamin tablet  Take 1 tablet by mouth daily.  Dose: 1 tablet     simvastatin 20 mg tablet  Commonly known as: ZOCOR  Take 20 mg by mouth nightly.  Dose: 20 mg     TRULICITY 1.5 mg/0.5 mL pen injector  Inject 1.5 mg under the skin once a week on Sunday.  Dose: 1.5 mg  Generic drug: dulaglutide                INSTRUCTIONS AND FOLLOW-UP   Instructions for after discharge       Ambulatory referral to Vestibular Rehab (PT)      To schedule an appointment for outpatient therapy, please call:    [phone]    Available locations are:  [facility] -  "[employer]  [address]        Call provider for:  difficulty breathing      Call provider for:  extreme fatigue      Call provider for:  persistent dizziness or light-headedness, headache, or visual disturbances      Call provider for:  persistent nausea or vomiting      Call provider for:  rash      Call provider for:  redness, tenderness, or signs of infection (pain, swelling, redness, odor or green/yellow discharge around incision site)      Call provider for:  severe uncontrolled pain      Call provider for:  temperature >100.4      Call provider for: blood sugar change      Call provider for blood sugar less than 70 or greater than 350    Call provider for: increased shortness of breath; weight gain (3 pounds/1 day or 5 pounds/1 week); more swelling in legs, ankles, feet or belly; need to sleep with extra pillows or in a chair.      Discharge Diet      Diet Type / Texture:  Regular  Cardiac (Low Sodium, Low Fat)  Diabetic, No Concentrated Sweets       Normal Activity as Tolerated      Review additional discharge directions in \"Instructions\" section on the last page              Important Issues to Address in Follow-Up  ADDITIONAL INSTRUCTIONS:  Follow-up with primary care physician Dr Lombardo Paz in 1-2 weeks to discuss your hospitalization and continue management of your other chronic conditions.   -You are recommended a blood pressure check in 1 week on your new blood pressure medication and re-evaluation of your vertigo.     Follow-up with your hematology/ oncology team in 1-2 weeks for continued cancer management and review your MRI brain.   -MRI brain showed no acute infarct, acute intracranial hemorrhage, abnormally enhancing intraparenchymal mass lesion or intra-axial mass effect. Tiny chronic right cerebellar infarcts, mild chronic microangiopathy and age-related involutional changes. Redemonstration of a partial empty sella, nonspecific. Heterogeneous T1 signal in the regional osseous " structures without associated marrow edema on the T2 sequence.  This is of indeterminate etiology. This could be related to heterogeneous osseous demineralization, however, a marrow infiltrative process could have this appearance.    -You are recommended discuss if a nuclear medicine bone scan is needed for further evaluation.     You are recommended outpatient vestibular rehab to help treat your vertigo symptoms.    ACTIVITY:  Activity as tolerated.    MEDICATIONS:  Meclizine: as needed for dizziness    Amlodipine: for your elevated blood pressures    Resume the rest of your medications as directed. Take all medications as prescribed.    Call your doctor or return to the Emergency Room of you develop any of the following:  Return of your original symptoms  Persistent nausea and vomiting   Chest Pain  Shortness of Breath  Fever above 100.4 degrees or chills  Difficulty or inability to urinate  Change in mental status from baseline      Scheduled Appointments  Encounter Information    This patient does not currently have any appointments scheduled.         Recommended Follow-up Visits  Follow-Up After Discharge       Lombardo Paz, Gina   Relationship: PCP - General        Follow up    Service: Follow-up with primary care physician Dr Lombardo Paz in 1-2 weeks to discuss your hospitalization and continue management of your other chronic conditions.   -You are recommended a blood pressure check in 1 week on your new blood pressure medication and re-evaluation of your vertigo.    Heme/onc in [place]        Follow up    Service: Follow-up with your hematology/ oncology team in 1-2 weeks for continued cancer management and review your MRI brain.   -MRI brain showed no acute infarct, acute intracranial hemorrhage, abnormally enhancing intraparenchymal mass lesion or intra-axial mass effect. Tiny chronic right cerebellar infarcts, mild chronic microangiopathy and age-related involutional changes. Redemonstration of a partial  empty sella, nonspecific. Heterogeneous T1 signal in the regional osseous structures without associated marrow edema on the T2 sequence.  This is of indeterminate etiology. This could be related to heterogeneous osseous demineralization, however, a marrow infiltrative process could have this appearance.    -You are recommended discuss if a nuclear medicine bone scan is needed for further evaluation.            Test Results Pending at Discharge  Pending Inpatient Labs       Order Current Status    [place] Draw Panel In process             LABS AND IMAGING   Pertinent Labs  CHEMISTRIES   Results from last 7 days   Lab Units 06/05/25  0555 06/04/25 0510 06/03/25 1915   SODIUM mEQ/L 139 140 137   POTASSIUM mEQ/L 4.2 3.6 3.8   CHLORIDE mEQ/L 107 108* 104   CO2 mEQ/L 26 25 26   BUN mg/dL 21 18 19   CREATININE mg/dL 1.0 0.9 0.8   GLUCOSE mg/dL 182* 177* 171*   CALCIUM mg/dL 9.9 9.7 10.1   EGFR mL/min/1.73m*2 57.1* >60.0 >60.0   ANION GAP mEQ/L 6 7 7     HEPATIC FUNCTION TESTS   Results from last 7 days   Lab Units 06/03/25 1915   AST IU/L 16   ALT IU/L 15   ALK PHOS IU/L 43   ALBUMIN g/dL 4.6   PROTEIN TOTAL g/dL 7.7   BILIRUBIN TOTAL mg/dL 0.4     CBC RESULTS   Results from last 7 days   Lab Units 06/05/25  0531 06/04/25 0510 06/03/25 1915   WBC K/uL 5.10 6.13 5.84   HEMOGLOBIN g/dL 13.1 13.2 13.0   HEMATOCRIT % 38.2 38.1 37.7   PLATELETS K/uL 202 203 207     ANEMIA STUDIES     COAGULATION     ENDOCRINE STUDIES   Lab Results   Component Value Date    HGBA1C 6.9 (H) 06/04/2025     LIPIDS   Results from last 7 days   Lab Units 06/04/25  0510   HDL mg/dL 68   LDL CALC mg/dL 62   CHOLESTEROL mg/dL 159   TRIGLYCERIDES mg/dL 146     CARDIAC   Results from last 7 days   Lab Units 06/03/25 2129 06/03/25 1915   HIGH SENSITIVE TROPONIN I pg/mL 8.1 8.3     MICROBIOLOGY   Microbiology Results       None            Pertinent Imaging  MRI BRAIN WITH AND WITHOUT CONTRAST  Result Date: 6/5/2025  IMPRESSION: 1.  No acute infarct,  acute intracranial hemorrhage, abnormally enhancing intraparenchymal mass lesion or intra-axial mass effect. 2.  Tiny chronic right cerebellar infarcts, mild chronic microangiopathy and age-related involutional changes. 3.  Redemonstration of a partial empty sella, nonspecific. 4.  Heterogeneous T1 signal in the regional osseous structures without associated marrow edema on the T2 sequence.  This is of indeterminate etiology. This could be related to heterogeneous osseous demineralization, however, a marrow infiltrative process could have this appearance.  Clinical correlation is recommended.  Consider further evaluation with a nuclear medicine bone scan.     CT HEAD WITHOUT IV CONTRAST  Result Date: 6/3/2025  IMPRESSION: No evidence of acute intracranial hemorrhage, mass effect or large acute territorial infarction by CT criteria.. Possible abnormal appearance of the calvarium/osseous structures either due to significant demineralization or underlying neoplastic process such as myeloma or metastatic disease.  Follow-up bone scan as well as further clinical workup suggested.  Follow-up brain MRI may also be helpful to evaluate calvarial marrow signal. COMMENT: Comparison: None TECHNIQUE: CT of the brain was performed without intravenous contrast. CT DOSE:  One or more dose reduction techniques (e.g. automated exposure control, adjustment of the mA and/or kV according to patient size, use of iterative reconstruction technique) utilized for this examination. FINDINGS: Ventricles, sulci and basal cisterns: Prominent compatible with cerebral volume loss without hydrocephalus Parenchyma: Patchy nonspecific probably microangiopathic white matter disease. No evidence of acute intracranial hemorrhage.  No mass effect or cerebral edema. No CT evidence of large acute territorial infarction. Extra-axial spaces: No extra-axial fluid collection. Imaged paranasal sinuses and mastoids: No significant opacities. Calvarium:  Innumerable punctate lucencies in the osseous structures uncertain if this is related to demineralization or if there is underlying multifocal neoplastic process. Extra calvarial structures:Lens replacements. Miscellaneous: Atherosclerotic vascular calcifications.  Partially empty sella.         OTHER SERVICES PROVIDED   Consults During Admission  IP CONSULT TO NEUROLOGY    Procedures Performed         Thank you for allowing the Division of Hospital Medicine to care for your patient.

## 2025-06-05 NOTE — DISCHARGE INSTRUCTIONS
ADDITIONAL INSTRUCTIONS:  Follow-up with primary care physician Dr Lombardo Paz in 1-2 weeks to discuss your hospitalization and continue management of your other chronic conditions.   -You are recommended a blood pressure check in 1 week on your new blood pressure medication and re-evaluation of your vertigo.     Follow-up with your hematology/ oncology team in 1-2 weeks for continued cancer management and review your MRI brain.   -MRI brain showed no acute infarct, acute intracranial hemorrhage, abnormally enhancing intraparenchymal mass lesion or intra-axial mass effect. Tiny chronic right cerebellar infarcts, mild chronic microangiopathy and age-related involutional changes. Redemonstration of a partial empty sella, nonspecific. Heterogeneous T1 signal in the regional osseous structures without associated marrow edema on the T2 sequence.  This is of indeterminate etiology. This could be related to heterogeneous osseous demineralization, however, a marrow infiltrative process could have this appearance.    -You are recommended discuss if a nuclear medicine bone scan is needed for further evaluation.     You are recommended outpatient vestibular rehab to help treat your vertigo symptoms.    ACTIVITY:  Activity as tolerated.    MEDICATIONS:  Meclizine: as needed for dizziness    Amlodipine: for your elevated blood pressures    Resume the rest of your medications as directed. Take all medications as prescribed.    Call your doctor or return to the Emergency Room of you develop any of the following:  Return of your original symptoms  Persistent nausea and vomiting   Chest Pain  Shortness of Breath  Fever above 100.4 degrees or chills  Difficulty or inability to urinate  Change in mental status from baseline

## 2025-06-05 NOTE — PROGRESS NOTES
NEUROLOGY DAILY PROGRESS NOTE       PATIENT NAME:  Lanny Rey        YOB: 1945  AGE:  80 y.o.     GENDER: female  MRN:  [id number]          PATIENT #: [id number]      SUBJECTIVE     Interval History: The patient was seen and examined.  Patient continues with intermittent dizziness, some spinning sensation, provoked with positional changes.  MRI brain was negative for stroke or mass.      REVIEW OF SYSTEMS   13 point ROS unchanged    MEDICATIONS     Infusions:         Scheduled:    amLODIPine  2.5 mg oral Daily    aspirin  81 mg oral Daily    atorvastatin  10 mg oral Daily    cyanocobalamin  2,000 mcg oral Daily    enoxaparin  40 mg subcutaneous Daily (6p)    famotidine  10 mg oral q AM    fenofibrate micronized  200 mg oral Daily with breakfast    insulin lispro U-100  3-5 Units subcutaneous TID with meals    multivitamin  1 tablet oral Daily    zanubrutinib  160 mg oral BID       VITAL SIGNS   Temperature: Temp (24hrs), Av.6 °C (97.9 °F), Min:36.5 °C (97.7 °F), Max:36.9 °C (98.4 °F)     BP Max:  Systolic (24hrs), Av , Min:118 , Max:200      BP Hall:  Diastolic (24hrs), Av, Min:63, Max:88    Recent:  Patient Vitals for the past 4 hrs:   BP Temp Temp src Pulse Resp SpO2   25 1045 139/64 36.5 °C (97.7 °F) Temporal 85 18 95 %   25 0900 (!) 148/72 36.6 °C (97.8 °F) Oral 78 18 95 %   25 0731 (!) 140/63 36.5 °C (97.7 °F) Oral 88 18 96 %         PHYSICAL EXAM     Neurological Examination  Patient is awake alert and fully oriented.    There is no aphasia.   There is no dysarthria.     Cranial nerves :  Pupils are equal, round, and reactive to light and accommodation bilaterally.    Eye movements are full without nystagmus.   Visual fields are full.    Facial strength is normal.     The tongue protrusion is midline.       Motor:   RUE 5/5  LUE 5/5  RLE 5/5  LLE5/5     Sensory exam:  Sensation is intact to light touch on the upper and lower extremities.     Imaging and  labs reviewed  Lab Results   Component Value Date    WBC 5.10 06/05/2025    HGB 13.1 06/05/2025    HCT 38.2 06/05/2025     06/05/2025    CHOL 159 06/04/2025    TRIG 146 06/04/2025    HDL 68 06/04/2025    ALT 15 06/03/2025    AST 16 06/03/2025     06/05/2025    K 4.2 06/05/2025     06/05/2025    CREATININE 1.0 06/05/2025    BUN 21 06/05/2025    CO2 26 06/05/2025    HGBA1C 6.9 (H) 06/04/2025     CT HEAD WITHOUT IV CONTRAST  Result Date: 6/3/2025  IMPRESSION: No evidence of acute intracranial hemorrhage, mass effect or large acute territorial infarction by CT criteria.. Possible abnormal appearance of the calvarium/osseous structures either due to significant demineralization or underlying neoplastic process such as myeloma or metastatic disease.  Follow-up bone scan as well as further clinical workup suggested.  Follow-up brain MRI may also be helpful to evaluate calvarial marrow signal. COMMENT: Comparison: None TECHNIQUE: CT of the brain was performed without intravenous contrast. CT DOSE:  One or more dose reduction techniques (e.g. automated exposure control, adjustment of the mA and/or kV according to patient size, use of iterative reconstruction technique) utilized for this examination. FINDINGS: Ventricles, sulci and basal cisterns: Prominent compatible with cerebral volume loss without hydrocephalus Parenchyma: Patchy nonspecific probably microangiopathic white matter disease. No evidence of acute intracranial hemorrhage.  No mass effect or cerebral edema. No CT evidence of large acute territorial infarction. Extra-axial spaces: No extra-axial fluid collection. Imaged paranasal sinuses and mastoids: No significant opacities. Calvarium: Innumerable punctate lucencies in the osseous structures uncertain if this is related to demineralization or if there is underlying multifocal neoplastic process. Extra calvarial structures:Lens replacements. Miscellaneous: Atherosclerotic vascular  calcifications.  Partially empty sella.      IMPRESSION/PLAN     In Summary:This is a right handed 80 y.o. female who medical history for CLL, DM, hyperlipidemia, MGUS, colon cancer, breast cancer, melanoma, PFO with closure comes to the hospital with persistent dizziness and blurred vision.      Patient had several episodes of dizziness and blurred vision.  Dizziness described as unsteadiness with a spinning sensation       Dizziness  Patient had several episodes of dizziness, with unsteadiness, consistent with  vertigo.      Recommendations  -Recommending meclizine for symptomatic treatment  -Recommending vestibular Therapy    -From a neurological point of view patient does not need to continue on aspirin  -Lipitor 10mg daily  -Blood pressure goal is normotension  -PT/OT      Neurology will sign off      Medical management and supportive care to continue by primary service.  Thank you for allowing me to participate in the care of this patient. If you have any further questions, please do not hesitate to contact me.  ALETHA Douglass  11:20 AM

## 2025-06-05 NOTE — PLAN OF CARE
Care Coordination Discharge Plan Note     Discharge Needs Assessment  Concerns to be Addressed: coping/stress, discharge planning, adjustment to diagnosis/illness  Current Discharge Risk: none    Anticipated Discharge Plan  Anticipated Discharge Disposition: home without assistance or services       Patient Choice  Offered/Gave Vendor List:         ---------------------------------------------------------------------------------------------------------------------    Interdisciplinary Discharge Plan Review:  Participants:, patient, family/lay caregiver, physician, nursing    Concerns Comments: Per attending patient is ready for discharge to home wiht family. PT/OT rec'd home with [facility]. CC met with patient and spouse to review discharge recommendations. Patient plans to return to her home in [place] this Sunday and will be following up with her family doctor. States that she has been followed for outpatient therapy and wishes to continue. Family will be providing transport at time of discharge. No further discharge needs identified.    Discharge Plan:   Disposition/Destination: Home  / Home  Discharge Facility:    Community Resources:      Discharge Transportation:  Is Out of Hospital DNR needed at Discharge: no  Does patient need discharge transport? No

## 2025-06-05 NOTE — PROGRESS NOTES
Physical Therapy -  Daily Treatment/Progress Note     Patient: Lanny Rey  Location: [facility] 3A 3023  MRN: [id number]  Today's date: 6/5/2025    HISTORY OF PRESENT ILLNESS     Lanny is a 80 y.o. female admitted on 6/3/2025 with TIA (transient ischemic attack) [G45.9]  Dizziness [R42]. Principal problem is TIA (transient ischemic attack).    Past Medical History  Lanny has a past medical history of CVID (common variable immunodeficiency) (CMS/HCC), Lipid disorder, and Type 2 diabetes mellitus (CMS/HCC).    History of Present Illness   Presents with dizziness, blurred vision, impaired balance, HA, N, speech/ comprehensive difficulty, b/l hand tingling. CTH shows no e/o acute intracranial hemorrhage, mass effect or large acute territorial infarction by CT criteria. Possible abnormal appearance of the calvarium/osseous structures either due to significant demineralization or underlying neoplastic process such as myeloma or metastatic diseas    PRIOR LEVEL OF FUNCTION AND LIVING ENVIRONMENT     Prior Level of Function      Flowsheet Row Most Recent Value   Ambulation independent   Transferring independent   Toileting independent   Bathing independent   Dressing independent   Eating independent   IADLs independent   Driving/Transportation    Communication understands/communicates without difficulty   Prior Level of Function Comment Independent PLOF, no AD use. History of one fall several years ago while rehabbing s/p prolonged hospitalization d/t COVID   Assistive Device Currently Used at Home walker, front-wheeled        Prior Living Environment      Flowsheet Row Most Recent Value   People in Home spouse   Current Living Arrangements home   Living Environment Comment Lives in [facility] with spouse, 1SH, 0 GAYE       VITALS AND PAIN     PT Vitals      Date/Time Pulse SpO2 BP BP Location BP Method Pt Position Who   06/05/25 1142 86 96 % 140/77 188/85 after mobiity sitting resting 5 min  156/74 Left upper arm Automatic Sitting MARIA L          PT Pain      Date/Time Pain Type Rating: Rest Rating: Activity Who   06/05/25 1142 Pain Assessment 0 0 MARIA L               Objective     OBJECTIVE     Start time:  1142  End time:  1211  Session Length: 29 min       General Observations  Patient received chair position. She was agreeable to therapy, no issues or concerns identified by nurse prior to session. Pt in chair  present    Precautions: fall              PT Eval and Treat - 06/05/25 1142          Cognition    Orientation Status oriented x 4     Affect/Mental Status WFL;flat/blunted affect     Follows Commands WFL     Cognitive Function WFL;safety deficit     Comment, Cognition plesant        Mobility Belt    Mobility Belt Used During Session no - other (see comments)     Reason Mobility Belt Not Used rushing to get to bathroom.        Sit/Stand Transfer    [place] supervision     Transfer Comments STS three tie this session steady. Controlled sitting.        Gait Training    [place], Gait supervision     Assistive Device walker, front-wheeled     Distance in Feet 160 feet   45' with shoe on R foot  then 12' without AD    Comment Reciprocal gait  in midstance drops to R on midstance on R  slow steady gait, benefits from RW.  Without RW  slowed gait  slightly aphrensive no LOB.   Gait trial 2 with shoe on R LE improved gait speed steady turning R and L not hesitant with Ambulation.        Impairments/Safety Issues    Impairments Affecting Function balance;endurance/activity tolerance     Functional Endurance Fair-                                              Education Documentation  Rehabilitation Therapy, taught by Bryan Louis PTA at 6/5/2025 12:36 PM.  Learner: Patient  Readiness: Acceptance  Method: Explanation, Demonstration  Response: Verbalizes Understanding, Needs Reinforcement  Comment: positioning of walker  while turning        Session Outcome  Patient chair position, in chair,  reclined at end of session, call light in reach, personal items in reach, chair alarm on. Nursing notified about patient's response to therapy/activity, patient's performance, patient's position, and change in vital signs.    AM-PAC™ - Mobility (Current Function)     Turning form your back to your side while in flat bed without using bedrails 4 - None   Moving from lying on your back to sitting on the side of a flat bed without using bedrails 4 - None   Moving to and from a bed to a chair 3 - A Little   Standing up from a chair using your arms 3 - A Little   To walk in a hospital room 3 - A Little   Climbing 3-5 steps with a railing 3 - A Little   AM-PAC™ Mobility Score 20          ASSESSMENT AND PLAN     Progress Summary  AMPAC20 Pt supervision with Transfers different heights. Gait supervsion with RW slowly turning no LOB, Pt reports fear of falling. With gait with RW  on midstance on R leans to R excessivley , with shoe on R no drop with gait improved gait speed turning. Pt progressing towards goals. Pt. will benefit from cont. PT.  will obtain walker at time of d/c.    Patient/Family Therapy Goals Statement: To feel better    PT Plan      Flowsheet Row Most Recent Value   Rehab Potential good, to achieve stated therapy goals at 06/05/2025 1142   Therapy Frequency 5 times/wk at 06/05/2025 1142   Planned Therapy Interventions bed mobility training, gait training, transfer training at 06/05/2025 1142       PT Discharge Recommendations      Flowsheet Row Most Recent Value   PT Recommended Discharge Disposition home with home health, home with assistance at 06/05/2025 1142   Anticipated Equipment Needs if Discharged Home (PT) walker, front-wheeled at 06/05/2025 1142            PT Goals      Flowsheet Row Most Recent Value   Bed Mobility Goal 1    Activity/Assistive Device bed mobility activities, all at 06/04/2025 1055   [place] modified independence at 06/04/2025 1055   Time Frame by discharge at  06/04/2025 1055   Progress/Outcome goal ongoing at 06/04/2025 1055   Transfer Goal 1    Activity/Assistive Device all transfers at 06/04/2025 1055   [place] modified independence at 06/04/2025 1055   Time Frame by discharge at 06/04/2025 1055   Progress/Outcome goal ongoing at 06/04/2025 1055   Gait Training Goal 1    Activity/Assistive Device gait (walking locomotion) at 06/04/2025 1055   [place] modified independence at 06/04/2025 1055   Distance 200' at 06/04/2025 1055   Time Frame by discharge at 06/04/2025 1055   Progress/Outcome goal ongoing at 06/04/2025 1055